# Patient Record
Sex: FEMALE | Race: BLACK OR AFRICAN AMERICAN | NOT HISPANIC OR LATINO
[De-identification: names, ages, dates, MRNs, and addresses within clinical notes are randomized per-mention and may not be internally consistent; named-entity substitution may affect disease eponyms.]

---

## 2017-06-12 PROBLEM — Z00.00 ENCOUNTER FOR PREVENTIVE HEALTH EXAMINATION: Status: ACTIVE | Noted: 2017-06-12

## 2017-06-20 ENCOUNTER — APPOINTMENT (OUTPATIENT)
Dept: PULMONOLOGY | Facility: CLINIC | Age: 82
End: 2017-06-20

## 2017-11-06 ENCOUNTER — INPATIENT (INPATIENT)
Facility: HOSPITAL | Age: 82
LOS: 6 days | Discharge: HOME | DRG: 287 | End: 2017-11-13
Attending: INTERNAL MEDICINE | Admitting: GENERAL PRACTICE
Payer: MEDICAID

## 2017-11-06 VITALS
DIASTOLIC BLOOD PRESSURE: 88 MMHG | OXYGEN SATURATION: 100 % | TEMPERATURE: 98 F | SYSTOLIC BLOOD PRESSURE: 170 MMHG | RESPIRATION RATE: 19 BRPM | HEART RATE: 53 BPM

## 2017-11-06 DIAGNOSIS — Z98.51 TUBAL LIGATION STATUS: Chronic | ICD-10-CM

## 2017-11-06 DIAGNOSIS — R07.9 CHEST PAIN, UNSPECIFIED: ICD-10-CM

## 2017-11-06 LAB
ALBUMIN SERPL ELPH-MCNC: 4.9 G/DL — SIGNIFICANT CHANGE UP (ref 3.3–5)
ALP SERPL-CCNC: 72 U/L — SIGNIFICANT CHANGE UP (ref 40–120)
ALT FLD-CCNC: 17 U/L RC — SIGNIFICANT CHANGE UP (ref 10–45)
ANION GAP SERPL CALC-SCNC: 14 MMOL/L — SIGNIFICANT CHANGE UP (ref 5–17)
APPEARANCE UR: CLEAR — SIGNIFICANT CHANGE UP
APTT BLD: 22.8 SEC — LOW (ref 27.5–37.4)
AST SERPL-CCNC: 25 U/L — SIGNIFICANT CHANGE UP (ref 10–40)
BACTERIA # UR AUTO: ABNORMAL /HPF
BASOPHILS # BLD AUTO: 0.1 K/UL — SIGNIFICANT CHANGE UP (ref 0–0.2)
BASOPHILS NFR BLD AUTO: 1.4 % — SIGNIFICANT CHANGE UP (ref 0–2)
BILIRUB SERPL-MCNC: 0.4 MG/DL — SIGNIFICANT CHANGE UP (ref 0.2–1.2)
BILIRUB UR-MCNC: NEGATIVE — SIGNIFICANT CHANGE UP
BUN SERPL-MCNC: 14 MG/DL — SIGNIFICANT CHANGE UP (ref 7–23)
CALCIUM SERPL-MCNC: 10.1 MG/DL — SIGNIFICANT CHANGE UP (ref 8.4–10.5)
CHLORIDE SERPL-SCNC: 103 MMOL/L — SIGNIFICANT CHANGE UP (ref 96–108)
CK MB BLD-MCNC: 2 % — SIGNIFICANT CHANGE UP (ref 0–3.5)
CK MB CFR SERPL CALC: 1.8 NG/ML — SIGNIFICANT CHANGE UP (ref 0–3.8)
CK MB CFR SERPL CALC: 2.1 NG/ML — SIGNIFICANT CHANGE UP (ref 0–3.8)
CK SERPL-CCNC: 91 U/L — SIGNIFICANT CHANGE UP (ref 25–170)
CK SERPL-CCNC: 98 U/L — SIGNIFICANT CHANGE UP (ref 25–170)
CO2 SERPL-SCNC: 24 MMOL/L — SIGNIFICANT CHANGE UP (ref 22–31)
COLOR SPEC: SIGNIFICANT CHANGE UP
CREAT SERPL-MCNC: 1.04 MG/DL — SIGNIFICANT CHANGE UP (ref 0.5–1.3)
DIFF PNL FLD: NEGATIVE — SIGNIFICANT CHANGE UP
EOSINOPHIL # BLD AUTO: 0.3 K/UL — SIGNIFICANT CHANGE UP (ref 0–0.5)
EOSINOPHIL NFR BLD AUTO: 4.6 % — SIGNIFICANT CHANGE UP (ref 0–6)
EPI CELLS # UR: SIGNIFICANT CHANGE UP /HPF
GLUCOSE SERPL-MCNC: 91 MG/DL — SIGNIFICANT CHANGE UP (ref 70–99)
GLUCOSE UR QL: NEGATIVE — SIGNIFICANT CHANGE UP
HCT VFR BLD CALC: 35.3 % — SIGNIFICANT CHANGE UP (ref 34.5–45)
HGB BLD-MCNC: 12.2 G/DL — SIGNIFICANT CHANGE UP (ref 11.5–15.5)
INR BLD: 0.98 RATIO — SIGNIFICANT CHANGE UP (ref 0.88–1.16)
KETONES UR-MCNC: NEGATIVE — SIGNIFICANT CHANGE UP
LEUKOCYTE ESTERASE UR-ACNC: NEGATIVE — SIGNIFICANT CHANGE UP
LIDOCAIN IGE QN: 42 U/L — SIGNIFICANT CHANGE UP (ref 7–60)
LYMPHOCYTES # BLD AUTO: 2.5 K/UL — SIGNIFICANT CHANGE UP (ref 1–3.3)
LYMPHOCYTES # BLD AUTO: 35.2 % — SIGNIFICANT CHANGE UP (ref 13–44)
MCHC RBC-ENTMCNC: 32.4 PG — SIGNIFICANT CHANGE UP (ref 27–34)
MCHC RBC-ENTMCNC: 34.4 GM/DL — SIGNIFICANT CHANGE UP (ref 32–36)
MCV RBC AUTO: 94 FL — SIGNIFICANT CHANGE UP (ref 80–100)
MONOCYTES # BLD AUTO: 0.5 K/UL — SIGNIFICANT CHANGE UP (ref 0–0.9)
MONOCYTES NFR BLD AUTO: 7.4 % — SIGNIFICANT CHANGE UP (ref 2–14)
NEUTROPHILS # BLD AUTO: 3.6 K/UL — SIGNIFICANT CHANGE UP (ref 1.8–7.4)
NEUTROPHILS NFR BLD AUTO: 51.4 % — SIGNIFICANT CHANGE UP (ref 43–77)
NITRITE UR-MCNC: NEGATIVE — SIGNIFICANT CHANGE UP
NT-PROBNP SERPL-SCNC: 78 PG/ML — SIGNIFICANT CHANGE UP (ref 0–300)
PH UR: 6 — SIGNIFICANT CHANGE UP (ref 5–8)
PLATELET # BLD AUTO: 253 K/UL — SIGNIFICANT CHANGE UP (ref 150–400)
POTASSIUM SERPL-MCNC: 4.3 MMOL/L — SIGNIFICANT CHANGE UP (ref 3.5–5.3)
POTASSIUM SERPL-SCNC: 4.3 MMOL/L — SIGNIFICANT CHANGE UP (ref 3.5–5.3)
PROT SERPL-MCNC: 8.5 G/DL — HIGH (ref 6–8.3)
PROT UR-MCNC: NEGATIVE — SIGNIFICANT CHANGE UP
PROTHROM AB SERPL-ACNC: 10.7 SEC — SIGNIFICANT CHANGE UP (ref 9.8–12.7)
RBC # BLD: 3.76 M/UL — LOW (ref 3.8–5.2)
RBC # FLD: 11.5 % — SIGNIFICANT CHANGE UP (ref 10.3–14.5)
SODIUM SERPL-SCNC: 141 MMOL/L — SIGNIFICANT CHANGE UP (ref 135–145)
SP GR SPEC: 1.01 — LOW (ref 1.01–1.02)
TROPONIN T SERPL-MCNC: <0.01 NG/ML — SIGNIFICANT CHANGE UP (ref 0–0.06)
TROPONIN T SERPL-MCNC: <0.01 NG/ML — SIGNIFICANT CHANGE UP (ref 0–0.06)
UROBILINOGEN FLD QL: NEGATIVE — SIGNIFICANT CHANGE UP
WBC # BLD: 7.1 K/UL — SIGNIFICANT CHANGE UP (ref 3.8–10.5)
WBC # FLD AUTO: 7.1 K/UL — SIGNIFICANT CHANGE UP (ref 3.8–10.5)

## 2017-11-06 PROCEDURE — 93010 ELECTROCARDIOGRAM REPORT: CPT

## 2017-11-06 PROCEDURE — 71010: CPT | Mod: 26

## 2017-11-06 PROCEDURE — 99285 EMERGENCY DEPT VISIT HI MDM: CPT | Mod: 25

## 2017-11-06 PROCEDURE — 76705 ECHO EXAM OF ABDOMEN: CPT | Mod: 26

## 2017-11-06 RX ORDER — SIMVASTATIN 20 MG/1
10 TABLET, FILM COATED ORAL AT BEDTIME
Qty: 0 | Refills: 0 | Status: DISCONTINUED | OUTPATIENT
Start: 2017-11-06 | End: 2017-11-13

## 2017-11-06 RX ORDER — BUDESONIDE AND FORMOTEROL FUMARATE DIHYDRATE 160; 4.5 UG/1; UG/1
2 AEROSOL RESPIRATORY (INHALATION)
Qty: 0 | Refills: 0 | Status: DISCONTINUED | OUTPATIENT
Start: 2017-11-06 | End: 2017-11-13

## 2017-11-06 RX ORDER — LEVOTHYROXINE SODIUM 125 MCG
25 TABLET ORAL DAILY
Qty: 0 | Refills: 0 | Status: DISCONTINUED | OUTPATIENT
Start: 2017-11-06 | End: 2017-11-13

## 2017-11-06 RX ORDER — HYDROCHLOROTHIAZIDE 25 MG
25 TABLET ORAL DAILY
Qty: 0 | Refills: 0 | Status: DISCONTINUED | OUTPATIENT
Start: 2017-11-06 | End: 2017-11-10

## 2017-11-06 RX ORDER — IPRATROPIUM/ALBUTEROL SULFATE 18-103MCG
3 AEROSOL WITH ADAPTER (GRAM) INHALATION ONCE
Qty: 0 | Refills: 0 | Status: COMPLETED | OUTPATIENT
Start: 2017-11-06 | End: 2017-11-06

## 2017-11-06 RX ORDER — HEPARIN SODIUM 5000 [USP'U]/ML
5000 INJECTION INTRAVENOUS; SUBCUTANEOUS EVERY 12 HOURS
Qty: 0 | Refills: 0 | Status: DISCONTINUED | OUTPATIENT
Start: 2017-11-06 | End: 2017-11-13

## 2017-11-06 RX ORDER — LOSARTAN POTASSIUM 100 MG/1
100 TABLET, FILM COATED ORAL DAILY
Qty: 0 | Refills: 0 | Status: DISCONTINUED | OUTPATIENT
Start: 2017-11-06 | End: 2017-11-10

## 2017-11-06 RX ORDER — ACETAMINOPHEN 500 MG
975 TABLET ORAL ONCE
Qty: 0 | Refills: 0 | Status: COMPLETED | OUTPATIENT
Start: 2017-11-06 | End: 2017-11-06

## 2017-11-06 RX ORDER — PANTOPRAZOLE SODIUM 20 MG/1
40 TABLET, DELAYED RELEASE ORAL
Qty: 0 | Refills: 0 | Status: DISCONTINUED | OUTPATIENT
Start: 2017-11-06 | End: 2017-11-13

## 2017-11-06 RX ORDER — ASPIRIN/CALCIUM CARB/MAGNESIUM 324 MG
324 TABLET ORAL ONCE
Qty: 0 | Refills: 0 | Status: COMPLETED | OUTPATIENT
Start: 2017-11-06 | End: 2017-11-06

## 2017-11-06 RX ORDER — IBUPROFEN 200 MG
400 TABLET ORAL THREE TIMES A DAY
Qty: 0 | Refills: 0 | Status: DISCONTINUED | OUTPATIENT
Start: 2017-11-06 | End: 2017-11-07

## 2017-11-06 RX ORDER — ALBUTEROL 90 UG/1
2 AEROSOL, METERED ORAL EVERY 6 HOURS
Qty: 0 | Refills: 0 | Status: DISCONTINUED | OUTPATIENT
Start: 2017-11-06 | End: 2017-11-13

## 2017-11-06 RX ADMIN — Medication 400 MILLIGRAM(S): at 21:17

## 2017-11-06 RX ADMIN — LOSARTAN POTASSIUM 100 MILLIGRAM(S): 100 TABLET, FILM COATED ORAL at 15:18

## 2017-11-06 RX ADMIN — Medication 0.1 MILLIGRAM(S): at 16:12

## 2017-11-06 RX ADMIN — Medication 3 MILLILITER(S): at 10:20

## 2017-11-06 RX ADMIN — Medication 400 MILLIGRAM(S): at 22:00

## 2017-11-06 RX ADMIN — Medication 324 MILLIGRAM(S): at 10:20

## 2017-11-06 RX ADMIN — Medication 125 MILLIGRAM(S): at 10:36

## 2017-11-06 RX ADMIN — HEPARIN SODIUM 5000 UNIT(S): 5000 INJECTION INTRAVENOUS; SUBCUTANEOUS at 18:57

## 2017-11-06 RX ADMIN — Medication 975 MILLIGRAM(S): at 11:33

## 2017-11-06 RX ADMIN — Medication 975 MILLIGRAM(S): at 10:20

## 2017-11-06 RX ADMIN — Medication 25 MILLIGRAM(S): at 16:12

## 2017-11-06 RX ADMIN — BUDESONIDE AND FORMOTEROL FUMARATE DIHYDRATE 2 PUFF(S): 160; 4.5 AEROSOL RESPIRATORY (INHALATION) at 18:57

## 2017-11-06 RX ADMIN — SIMVASTATIN 10 MILLIGRAM(S): 20 TABLET, FILM COATED ORAL at 21:17

## 2017-11-06 RX ADMIN — Medication 3 MILLILITER(S): at 11:33

## 2017-11-06 NOTE — ED PROVIDER NOTE - OBJECTIVE STATEMENT
82 yo female with HTN hypothyroidism asthma hx of stroke presenting with constant crampy squeezing chest pain which radiates to the back associated with chest pain.  symptoms have been going on since Thursday.  received flu vaccine one week ago.  still endorses chest pain at presentation.  states that it is in the epigastric region with radiation to the right shoulder.  pain worse with movement, eating and taking a deep breath.  last bm was yesterday, nonbloody.      pcp- [facility] 82 yo female with HTN hypothyroidism asthma hx of stroke presenting with constant crampy squeezing chest pain which radiates to the back associated with chest pain.  symptoms have been going on since Friday.  received flu vaccine one week ago.  still endorses chest pain at presentation.  states that it is in the epigastric region with radiation to the right shoulder.  pain worse with movement, eating and taking a deep breath.  last bm was yesterday, nonbloody.      pcp- [facility] [facility] 82 yo female with HTN hypothyroidism asthma hx of stroke presenting with constant crampy squeezing chest pain which radiates to the back associated with chest pain.  symptoms have been going on since Friday.  received flu vaccine one week ago.  still endorses chest pain at presentation.  states that it is in the epigastric region with radiation to the right shoulder.  pain worse with movement, eating and taking a deep breath.  last bm was yesterday, nonbloody.      pcp- [facility] [place] 84 yo female with HTN hypothyroidism asthma hx of stroke presenting with constant crampy squeezing chest pain which radiates to the back associated with chest pain.  symptoms have been going on since Friday.  received flu vaccine one week ago.  still endorses chest pain at presentation.  states that it is in the epigastric region with radiation to the right shoulder.  pain worse with movement, eating and taking a deep breath.  last bm was yesterday, nonbloody.  doesn't think she has ever had a stress test or echo.     pcp- [facility] 84 yo female with HTN hypothyroidism asthma hx of stroke presenting with constant crampy squeezing chest pain which radiates to the back associated with chest pain.  symptoms have been going on since Friday.  received flu vaccine one week ago.  still endorses chest pain at presentation.  states that it is in the epigastric region with radiation to the right shoulder.  pain worse with movement, eating and taking a deep breath.  last bm was yesterday, nonbloody.  last echo was in the summer does not remember her last stress test.     pcp- [facility]

## 2017-11-06 NOTE — H&P ADULT - HISTORY OF PRESENT ILLNESS
82 yo female with PMH HTN, hypothyroidism, asthma, prior CVA, to ER with constant crampy squeezing chest pain which radiates to the back associated with chest pain.  Symptoms have been going on since Friday.  Received flu vaccine one week ago.  Still endorses chest pain at presentation.  States that it is in the epigastric region with radiation to the right shoulder.  Pain worse with movement, eating and taking a deep breath.  Last echo was in the summer does not remember her last stress test.     Denies HA, syncope, abdominal pain, hematuria, melena, fevers or leg edema. 82 yo female with PMH HTN, hypothyroidism, asthma, prior CVA, to ER with constant crampy squeezing chest pain which radiates to the back associated with chest pain.  Symptoms have been going on since Friday but worsened this AM.  Received flu vaccine one week ago.  Still endorses chest pain with palpation of chest wall. No angina. States that it is in the epigastric region with radiation to the right shoulder.  Pain worse with movement, eating and taking a deep breath.  Last echo was in the summer and reported to family as normal. Does not remember her last stress test.   No prior cardiac stents or DM.     Denies HA, syncope, abdominal pain, hematuria, melena, fevers or leg edema. 84 yo female with PMH HTN, hypothyroidism, asthma, prior CVA, to ER with constant crampy squeezing chest pain which radiates to the back associated with chest pain.  Symptoms have been going on since Friday but worsened this AM.  Received flu vaccine one week ago.  Still endorses chest pain with palpation of chest wall. No angina. States that it is in the epigastric region with radiation to the right shoulder.  Pain worse with movement, eating and taking a deep breath.  Last echo was in the summer and reported to family as normal. Does not remember her last stress test.   No prior cardiac stents or DM.     Denies HA, syncope, abdominal pain, hematuria, melena, fevers or leg edema.    Daughter phone number to assist with translation is [phone]

## 2017-11-06 NOTE — ED PROVIDER NOTE - PROGRESS NOTE DETAILS
Windy MCGREGOR: Patient reassessed. Lung exam has improved. Patient moving air well. She is asking for another neb treatment. Will order. Windy MCGREGOR: Patient continues to complain of chest pain. TWI in lateral leads with no prior ekg for comparison. Will admit.

## 2017-11-06 NOTE — ED ADULT NURSE REASSESSMENT NOTE - NS ED NURSE REASSESS COMMENT FT1
SOB and wheezes improved after duoneb. Chest pain partially relieved with tylenol, still complaining of chest squeezing. Patient on cardiac monitor, daughter at bedside will continue to reassess.

## 2017-11-06 NOTE — ED PROVIDER NOTE - MUSCULOSKELETAL, MLM
Spine appears normal, range of motion is not limited, no muscle or joint tenderness Spine appears normal, range of motion is not limited, right posterior chest wall tenderness over the lower ribs, 1+ pitting edema in bilateral lower extremities

## 2017-11-06 NOTE — ED PROVIDER NOTE - NS ED ROS FT
Constitutional: no fever  Eyes: no conjunctivitis  Ears: no ear pain   Nose: no nasal congestion, Mouth/Throat: no throat pain, Neck: no stiffness  Cardiovascular: + chest pain +SOB  Chest: + cough  Gastrointestinal: + abdominal pain, no vomiting and diarrhea  MSK: no joint pain  : no dysuria  Skin: no rash  Neuro: no LOC

## 2017-11-06 NOTE — ED PROVIDER NOTE - MEDICAL DECISION MAKING DETAILS
84 yo female with chest pain; gallbladder etiology vs asthma vs acs; will obtain bloodwork ekg cxr neb gallbladder us --> re eval

## 2017-11-06 NOTE — ED PROVIDER NOTE - ATTENDING CONTRIBUTION TO CARE
I performed a history and physical exam of the patient and discussed their management with the resident. I reviewed the resident's note and agree with the documented findings and plan of care. My medical decision making and observations are found above.    Patient is an 84 yo F with history of HTN, hypothyroidism, CVA here for chest pain and sob since 5 days. Patient was recently diagnosed with 'asthma' 2 months ago. + smoking history > 49 yo. Patient was a chain smoker for about 20 years. No fevers. + cough. No nausea, vomiting. She complains of pain in her right abdomen, right chest with radiation to right shoulder. She has not tried any neb treatments at home.  VS noted  Gen. no acute distress, Non toxic   HEENT: EOMI, mmm  Lungs: + end expiratory wheezing  CVS: RRR   Abd; Soft ttp in RUQ, no rebound or guarding, + right CVA tenderness  Ext: min b/l edema  Skin: no rash  Neuro AAOx3 non focal clear speech  a/p: sob/cp - wheezing on exam - will treat with nebs and reassess. Abd exam sig for RUQ tenderness - will do ED US. Will get CE, cbc, cmp, CXR and reassess.

## 2017-11-06 NOTE — CONSULT NOTE ADULT - SUBJECTIVE AND OBJECTIVE BOX
Patient is a 83y old  Female who presents with a chief complaint of CP.    HPI: Pt awoke ~5 AM with epigastric chest discomfort.  She reports over past 1-2 weeks she has had a cough with cold-like symptoms and wheezing, treated by pmd.  She got the flu shot ~1 week ago.  She has had constant chest discomfort since waking, worse with inspiration, cough, sneeze, palpation or movement.  She also has discomfort along right sided ribs and back.  She states symptoms have improved after nebulizers in ER.           *****PAST MEDICAL / Surgical  HISTORY:  PAST MEDICAL & SURGICAL HISTORY:  Hypothyroidism  CVA (cerebral vascular accident)  Hypertension  H/O tubal ligation           *****FAMILY HISTORY:  FAMILY HISTORY:           *****SOCIAL HISTORY:  Alcohol: None  Smoking: None         *****ALLERGIES:   Allergies    penicillin (Unknown)    Intolerances             *****MEDICATIONS:  MEDICATIONS  (STANDING):    MEDICATIONS  (PRN):           *****REVIEW OF SYSTEM:  GEN: no fever, no chills, no pain  RESP: no SOB, no cough, no sputum  CVS: no chest pain, no palpitations, no edema  GI: no abdominal pain, no nausea, no vomiting, no constipation, no diarrhea  : no dysurea, no frequency, no hematurea  Neuro: no headache, no dizziness  PSYCH: no anxiety, no depression  Derm : no itching, no rash         *****VITAL SIGNS:  T(C): 36.4 (17 @ 10:00), Max: 36.4 (17 @ 10:00)  HR: 68 (17 @ 12:29) (53 - 68)  BP: 178/80 (17 @ 12:29) (170/88 - 178/80)  RR: 18 (17 @ 12:29) (18 - 19)  SpO2: 98% (17 @ 12:29) (98% - 100%)  Wt(kg): --           *****PHYSICAL EXAM:  GEN: A&O X 3 , NAD , comfortable  HEENT: NCAT, PERRL, MMM, hearing intact  Neck: supple , no JVD  CVS: S1S2 , regular , No M/R/G appreciated  PULM: CTA B/L,  no W/R/R appreciated (post nebulizers x2)  ABD.: soft. non tender, non distended,  bowel sounds present  Extrem: intact pulses , no edema   Derm: No rash , no ecchymoses  PSYCH : normal mood,  no delusion not anxious         *****LAB AND IMAGIN.2   7.1   )-----------( 253      ( 2017 10:06 )             35.3               11-    141  |  103  |  14  ----------------------------<  91  4.3   |  24  |  1.04    Ca    10.1      2017 10:06    TPro  8.5<H>  /  Alb  4.9  /  TBili  0.4  /  DBili  x   /  AST  25  /  ALT  17  /  AlkPhos  72  11-06    PT/INR - ( 2017 10:46 )   PT: 10.7 sec;   INR: 0.98 ratio         PTT - ( 2017 10:46 )  PTT:22.8 sec            CARDIAC MARKERS ( 2017 10:06 )  x     / <0.01 ng/mL / 98 U/L / x     / 2.1 ng/mL              Urinalysis Basic - ( 2017 11:05 )    Color: PL Yellow / Appearance: Clear / S.007 / pH: x  Gluc: x / Ketone: Negative  / Bili: Negative / Urobili: Negative   Blood: x / Protein: Negative / Nitrite: Negative   Leuk Esterase: Negative / RBC: x / WBC x   Sq Epi: x / Non Sq Epi: OCC /HPF / Bacteria: Few /HPF        [All pertinent recent Imaging reports reviewed]         *****A S S E S S M E N T   A N D   P L A N :  83F with pleuritic CP after 1-2 weeks of ?viral URI.  No S+S of CHF (low bnp, clear cxr)  first set CE negative, repeat to r/o MI  tele for now  check RVP  echo  low suspicion for acs but given risk factors would get nuc stress test  per daughter pt is active so less likely PE  check LE doppler  likely musculoskeletal pain  NSAIDS prn  nebs prn  consider pulm eval      ___________________________  Will follow with you.  Thank you,  BRENDAN BonillaO.

## 2017-11-06 NOTE — H&P ADULT - ASSESSMENT
84 yo female with PMH HTN, hypothyroidism, asthma, prior CVA, to ER with constant crampy squeezing chest pain which radiates to the back associated with chest pain.  Symptoms have been going on since Friday.  Received flu vaccine one week ago.  Still endorses chest pain at presentation.  States that it is in the epigastric region with radiation to the right shoulder.  Pain worse with movement, eating and taking a deep breath.  Last echo was in the summer does not remember her last stress test.    CV: Atypical CP. CPK every 8 hours. Admit to telemetry. 84 yo female with PMH HTN, hypothyroidism, asthma, prior CVA, to ER with constant crampy squeezing chest pain which radiates to the back associated with chest pain.  Symptoms have been going on since Friday.  Received flu vaccine one week ago.  Still endorses chest pain at presentation.  States that it is in the epigastric region with radiation to the right shoulder.  Pain worse with movement, eating and taking a deep breath.  Last echo was in the summer does not remember her last stress test.      EKG in ER shows NSR, first degree AV block and LVH. Initial Labs and CPK all normal. BNP of 78 essential excludes CHF as consideration.     CV: Atypical CP. CPK every 8 hours. Admit to telemetry. Continue Cozaar 100 mg/day and HCTZ 25 mg/day, Zocur 10 mg/day.  Lipid panel in AM. Chemical stress test in AM. Add Motrin tid for reproducible CP on palpation.     Pulmonary: Continue Symbicort bid and Proventil PRN.     Endo: Continue Synthroid 25 mcg/day. TSH in AM.

## 2017-11-06 NOTE — ED ADULT NURSE NOTE - OBJECTIVE STATEMENT
83y female presents to ED complaining of chest pain. Pt is a 83y female presents to ED complaining of chest pain. Pt is a a/ox3, speaking Russian with daughter at bedside providing interpretation, denying translation services. Daughter states that her mother has felt off since Friday and this morning her mother was very SOB with cough, diaphroetic complaining of substernal chest tightness that radiates to back. Wheezes auscultated bilaterally, skin warm dry intact, radial and pedal pulses strong and regular. Pt abdomen is soft and tender to palpation in RUQ, bowel sounds present in all  quadrants. Pt denies fever and chills, denies numbness and tingling, denies dysuria, denies N/V/D. Last BM was yester, no blood in stool. Pt is resting in bed with family at bedside, EKG completed.

## 2017-11-06 NOTE — H&P ADULT - NSHPPHYSICALEXAM_GEN_ALL_CORE
PHYSICAL EXAMINATION:  Vital Signs Last 24 Hrs  T(C): 36.6 (06 Nov 2017 13:27), Max: 36.6 (06 Nov 2017 13:27)  T(F): 97.9 (06 Nov 2017 13:27), Max: 97.9 (06 Nov 2017 13:27)  HR: 64 (06 Nov 2017 13:27) (53 - 68)  BP: 175/73 (06 Nov 2017 13:27) (170/88 - 178/80)  BP(mean): --  RR: 18 (06 Nov 2017 13:27) (18 - 19)  SpO2: 96% (06 Nov 2017 13:27) (96% - 100%)  CAPILLARY BLOOD GLUCOSE          GENERAL: NAD, well-groomed, well-developed  HEAD:  atraumatic, normocephalic  EYES: sclera anicteric  ENMT: mucous membranes moist  NECK: supple, No JVD  CHEST/LUNG: clear to auscultation bilaterally; no rales, rhonchi, or wheezing b/l  HEART: normal S1, S2  ABDOMEN: BS+, soft, ND, NT   EXTREMITIES:  pulses palpable; no clubbing, cyanosis, or edema b/l LEs  NEURO: awake, alert, interactive; moves all extremities  SKIN: no rashes or lesions PHYSICAL EXAMINATION:  Vital Signs Last 24 Hrs  T(C): 36.6 (06 Nov 2017 13:27), Max: 36.6 (06 Nov 2017 13:27)  T(F): 97.9 (06 Nov 2017 13:27), Max: 97.9 (06 Nov 2017 13:27)  HR: 64 (06 Nov 2017 13:27) (53 - 68)  BP: 175/73 (06 Nov 2017 13:27) (170/88 - 178/80)  BP(mean): --  RR: 18 (06 Nov 2017 13:27) (18 - 19)  SpO2: 96% (06 Nov 2017 13:27) (96% - 100%)  CAPILLARY BLOOD GLUCOSE          GENERAL: NAD, well-groomed, well-developed, daughter at bedside. NO CP at present.   HEAD:  atraumatic, normocephalic  EYES: sclera anicteric  ENMT: mucous membranes moist  NECK: supple, No JVD  CHEST/LUNG: clear to auscultation bilaterally; no rales, rhonchi, or wheezing b/l  HEART: normal S1, S2  ABDOMEN: BS+, soft, ND, NT   EXTREMITIES:  pulses palpable; no clubbing, cyanosis, or edema b/l LEs  NEURO: awake, alert, interactive; moves all extremities  SKIN: no rashes or lesions

## 2017-11-06 NOTE — H&P ADULT - NSHPLABSRESULTS_GEN_ALL_CORE
LABS:                        12.2   7.1   )-----------( 253      ( 2017 10:06 )             35.3     11-    141  |  103  |  14  ----------------------------<  91  4.3   |  24  |  1.04    Ca    10.1      2017 10:06    TPro  8.5<H>  /  Alb  4.9  /  TBili  0.4  /  DBili  x   /  AST  25  /  ALT  17  /  AlkPhos  72  11-06    PT/INR - ( 2017 10:46 )   PT: 10.7 sec;   INR: 0.98 ratio         PTT - ( 2017 10:46 )  PTT:22.8 sec  Urinalysis Basic - ( 2017 11:05 )    Color: PL Yellow / Appearance: Clear / S.007 / pH: x  Gluc: x / Ketone: Negative  / Bili: Negative / Urobili: Negative   Blood: x / Protein: Negative / Nitrite: Negative   Leuk Esterase: Negative / RBC: x / WBC x   Sq Epi: x / Non Sq Epi: OCC /HPF / Bacteria: Few /HPF          RADIOLOGY & ADDITIONAL TESTS:

## 2017-11-07 LAB
CHOLEST SERPL-MCNC: 175 MG/DL — SIGNIFICANT CHANGE UP (ref 10–199)
CK MB BLD-MCNC: 2.9 % — SIGNIFICANT CHANGE UP (ref 0–3.5)
CK MB CFR SERPL CALC: 2.4 NG/ML — SIGNIFICANT CHANGE UP (ref 0–3.8)
CK SERPL-CCNC: 83 U/L — SIGNIFICANT CHANGE UP (ref 25–170)
CK SERPL-CCNC: 88 U/L — SIGNIFICANT CHANGE UP (ref 25–170)
CULTURE RESULTS: NO GROWTH — SIGNIFICANT CHANGE UP
HDLC SERPL-MCNC: 61 MG/DL — SIGNIFICANT CHANGE UP (ref 40–125)
LIPID PNL WITH DIRECT LDL SERPL: 100 MG/DL — SIGNIFICANT CHANGE UP
MYOGLOBIN SERPL-MCNC: 33 NG/ML — SIGNIFICANT CHANGE UP (ref 9–82)
SPECIMEN SOURCE: SIGNIFICANT CHANGE UP
TOTAL CHOLESTEROL/HDL RATIO MEASUREMENT: 2.9 RATIO — LOW (ref 3.3–7.1)
TRIGL SERPL-MCNC: 69 MG/DL — SIGNIFICANT CHANGE UP (ref 10–149)
TROPONIN T SERPL-MCNC: <0.01 NG/ML — SIGNIFICANT CHANGE UP (ref 0–0.06)
TSH SERPL-MCNC: 0.68 UIU/ML — SIGNIFICANT CHANGE UP (ref 0.27–4.2)

## 2017-11-07 PROCEDURE — 78452 HT MUSCLE IMAGE SPECT MULT: CPT | Mod: 26

## 2017-11-07 PROCEDURE — 93970 EXTREMITY STUDY: CPT | Mod: 26

## 2017-11-07 PROCEDURE — 93016 CV STRESS TEST SUPVJ ONLY: CPT

## 2017-11-07 PROCEDURE — 93018 CV STRESS TEST I&R ONLY: CPT

## 2017-11-07 PROCEDURE — 93010 ELECTROCARDIOGRAM REPORT: CPT

## 2017-11-07 RX ADMIN — PANTOPRAZOLE SODIUM 40 MILLIGRAM(S): 20 TABLET, DELAYED RELEASE ORAL at 05:08

## 2017-11-07 RX ADMIN — Medication 25 MICROGRAM(S): at 05:08

## 2017-11-07 RX ADMIN — BUDESONIDE AND FORMOTEROL FUMARATE DIHYDRATE 2 PUFF(S): 160; 4.5 AEROSOL RESPIRATORY (INHALATION) at 17:09

## 2017-11-07 RX ADMIN — Medication 25 MILLIGRAM(S): at 05:08

## 2017-11-07 RX ADMIN — LOSARTAN POTASSIUM 100 MILLIGRAM(S): 100 TABLET, FILM COATED ORAL at 05:08

## 2017-11-07 RX ADMIN — SIMVASTATIN 10 MILLIGRAM(S): 20 TABLET, FILM COATED ORAL at 21:46

## 2017-11-07 RX ADMIN — BUDESONIDE AND FORMOTEROL FUMARATE DIHYDRATE 2 PUFF(S): 160; 4.5 AEROSOL RESPIRATORY (INHALATION) at 05:08

## 2017-11-07 RX ADMIN — Medication 400 MILLIGRAM(S): at 05:08

## 2017-11-07 RX ADMIN — Medication 400 MILLIGRAM(S): at 06:04

## 2017-11-07 RX ADMIN — HEPARIN SODIUM 5000 UNIT(S): 5000 INJECTION INTRAVENOUS; SUBCUTANEOUS at 17:09

## 2017-11-07 RX ADMIN — HEPARIN SODIUM 5000 UNIT(S): 5000 INJECTION INTRAVENOUS; SUBCUTANEOUS at 05:09

## 2017-11-07 NOTE — CHART NOTE - NSCHARTNOTEFT_GEN_A_CORE
Called by radiologist with abnormal stress test results, result was called to cardiologist Dr. Patel who states that will make a plan of care in am.  Pt seen and examined no acute distress noted. Will continue cardiac monitor.     Stefano Briceno   #[phone]

## 2017-11-07 NOTE — PROGRESS NOTE ADULT - ASSESSMENT
pt  with  chest  pressure,  resolved  tele, sinus  bradycardia   waiting echo/ stress test  on statin, coxaar,  hctz  seen by card  dc  motrin  spoke  with daughter

## 2017-11-07 NOTE — PROVIDER CONTACT NOTE (OTHER) - ACTION/TREATMENT ORDERED:
EKG ordered. NP will assess patient at bedside and continue to monitor on tele at this time. EKG ordered. NP will assess patient at bedside and continue to monitor on tele at this time.  Update: Patient sinus bradycardia with first degree AV block that was present on admission.

## 2017-11-08 LAB
ANION GAP SERPL CALC-SCNC: 14 MMOL/L — SIGNIFICANT CHANGE UP (ref 5–17)
BUN SERPL-MCNC: 31 MG/DL — HIGH (ref 7–23)
CALCIUM SERPL-MCNC: 9.8 MG/DL — SIGNIFICANT CHANGE UP (ref 8.4–10.5)
CHLORIDE SERPL-SCNC: 104 MMOL/L — SIGNIFICANT CHANGE UP (ref 96–108)
CO2 SERPL-SCNC: 24 MMOL/L — SIGNIFICANT CHANGE UP (ref 22–31)
CREAT SERPL-MCNC: 1.24 MG/DL — SIGNIFICANT CHANGE UP (ref 0.5–1.3)
GLUCOSE SERPL-MCNC: 100 MG/DL — HIGH (ref 70–99)
HCT VFR BLD CALC: 32.6 % — LOW (ref 34.5–45)
HGB BLD-MCNC: 11 G/DL — LOW (ref 11.5–15.5)
MCHC RBC-ENTMCNC: 30.6 PG — SIGNIFICANT CHANGE UP (ref 27–34)
MCHC RBC-ENTMCNC: 33.7 GM/DL — SIGNIFICANT CHANGE UP (ref 32–36)
MCV RBC AUTO: 90.8 FL — SIGNIFICANT CHANGE UP (ref 80–100)
PLATELET # BLD AUTO: 272 K/UL — SIGNIFICANT CHANGE UP (ref 150–400)
POTASSIUM SERPL-MCNC: 4.3 MMOL/L — SIGNIFICANT CHANGE UP (ref 3.5–5.3)
POTASSIUM SERPL-SCNC: 4.3 MMOL/L — SIGNIFICANT CHANGE UP (ref 3.5–5.3)
RBC # BLD: 3.59 M/UL — LOW (ref 3.8–5.2)
RBC # FLD: 13.4 % — SIGNIFICANT CHANGE UP (ref 10.3–14.5)
SODIUM SERPL-SCNC: 142 MMOL/L — SIGNIFICANT CHANGE UP (ref 135–145)
WBC # BLD: 10.95 K/UL — HIGH (ref 3.8–10.5)
WBC # FLD AUTO: 10.95 K/UL — HIGH (ref 3.8–10.5)

## 2017-11-08 RX ORDER — ASPIRIN/CALCIUM CARB/MAGNESIUM 324 MG
81 TABLET ORAL DAILY
Qty: 0 | Refills: 0 | Status: DISCONTINUED | OUTPATIENT
Start: 2017-11-08 | End: 2017-11-13

## 2017-11-08 RX ORDER — ACETAMINOPHEN 500 MG
650 TABLET ORAL EVERY 6 HOURS
Qty: 0 | Refills: 0 | Status: DISCONTINUED | OUTPATIENT
Start: 2017-11-08 | End: 2017-11-13

## 2017-11-08 RX ORDER — CLOPIDOGREL BISULFATE 75 MG/1
75 TABLET, FILM COATED ORAL DAILY
Qty: 0 | Refills: 0 | Status: DISCONTINUED | OUTPATIENT
Start: 2017-11-08 | End: 2017-11-13

## 2017-11-08 RX ADMIN — HEPARIN SODIUM 5000 UNIT(S): 5000 INJECTION INTRAVENOUS; SUBCUTANEOUS at 19:22

## 2017-11-08 RX ADMIN — PANTOPRAZOLE SODIUM 40 MILLIGRAM(S): 20 TABLET, DELAYED RELEASE ORAL at 05:23

## 2017-11-08 RX ADMIN — Medication 650 MILLIGRAM(S): at 16:12

## 2017-11-08 RX ADMIN — Medication 81 MILLIGRAM(S): at 13:16

## 2017-11-08 RX ADMIN — CLOPIDOGREL BISULFATE 75 MILLIGRAM(S): 75 TABLET, FILM COATED ORAL at 13:16

## 2017-11-08 RX ADMIN — HEPARIN SODIUM 5000 UNIT(S): 5000 INJECTION INTRAVENOUS; SUBCUTANEOUS at 05:23

## 2017-11-08 RX ADMIN — SIMVASTATIN 10 MILLIGRAM(S): 20 TABLET, FILM COATED ORAL at 22:00

## 2017-11-08 RX ADMIN — BUDESONIDE AND FORMOTEROL FUMARATE DIHYDRATE 2 PUFF(S): 160; 4.5 AEROSOL RESPIRATORY (INHALATION) at 19:22

## 2017-11-08 RX ADMIN — BUDESONIDE AND FORMOTEROL FUMARATE DIHYDRATE 2 PUFF(S): 160; 4.5 AEROSOL RESPIRATORY (INHALATION) at 05:23

## 2017-11-08 RX ADMIN — Medication 25 MICROGRAM(S): at 05:23

## 2017-11-08 RX ADMIN — Medication 25 MILLIGRAM(S): at 05:23

## 2017-11-08 RX ADMIN — LOSARTAN POTASSIUM 100 MILLIGRAM(S): 100 TABLET, FILM COATED ORAL at 05:23

## 2017-11-08 RX ADMIN — Medication 650 MILLIGRAM(S): at 17:00

## 2017-11-08 NOTE — PROGRESS NOTE ADULT - ASSESSMENT
pt  with  chest  pressure,  resolved  tele, sinus  bradycardia   waiting echo/   stress test, normal  ef,  abnormal, Tid  may  need  cardiac cath, defer  to  card  on statin, coxaar,  hctz.  will  add lopressor  seen by card  dc  motrin  spoke  with daughter pt  with  chest  pressure,  resolved  tele, sinus  bradycardia   waiting echo/   stress test, normal  ef,  abnormal, Tid  may  need  cardiac cath, defer  to  card  on statin, coxaar,  hctz.   unable  to add  lopressor, hr  was  45  seen by card  dc  motrin  spoke  with daughter

## 2017-11-09 LAB
ANION GAP SERPL CALC-SCNC: 18 MMOL/L — HIGH (ref 5–17)
BUN SERPL-MCNC: 26 MG/DL — HIGH (ref 7–23)
CALCIUM SERPL-MCNC: 10.1 MG/DL — SIGNIFICANT CHANGE UP (ref 8.4–10.5)
CHLORIDE SERPL-SCNC: 100 MMOL/L — SIGNIFICANT CHANGE UP (ref 96–108)
CO2 SERPL-SCNC: 21 MMOL/L — LOW (ref 22–31)
CREAT SERPL-MCNC: 1.19 MG/DL — SIGNIFICANT CHANGE UP (ref 0.5–1.3)
GLUCOSE BLDC GLUCOMTR-MCNC: 89 MG/DL — SIGNIFICANT CHANGE UP (ref 70–99)
GLUCOSE SERPL-MCNC: 125 MG/DL — HIGH (ref 70–99)
HCT VFR BLD CALC: 33.8 % — LOW (ref 34.5–45)
HGB BLD-MCNC: 11.1 G/DL — LOW (ref 11.5–15.5)
MCHC RBC-ENTMCNC: 30.2 PG — SIGNIFICANT CHANGE UP (ref 27–34)
MCHC RBC-ENTMCNC: 32.8 GM/DL — SIGNIFICANT CHANGE UP (ref 32–36)
MCV RBC AUTO: 91.8 FL — SIGNIFICANT CHANGE UP (ref 80–100)
PLATELET # BLD AUTO: 281 K/UL — SIGNIFICANT CHANGE UP (ref 150–400)
POTASSIUM SERPL-MCNC: 4.1 MMOL/L — SIGNIFICANT CHANGE UP (ref 3.5–5.3)
POTASSIUM SERPL-SCNC: 4.1 MMOL/L — SIGNIFICANT CHANGE UP (ref 3.5–5.3)
RBC # BLD: 3.68 M/UL — LOW (ref 3.8–5.2)
RBC # FLD: 13.8 % — SIGNIFICANT CHANGE UP (ref 10.3–14.5)
SODIUM SERPL-SCNC: 139 MMOL/L — SIGNIFICANT CHANGE UP (ref 135–145)
WBC # BLD: 8.28 K/UL — SIGNIFICANT CHANGE UP (ref 3.8–10.5)
WBC # FLD AUTO: 8.28 K/UL — SIGNIFICANT CHANGE UP (ref 3.8–10.5)

## 2017-11-09 PROCEDURE — 93306 TTE W/DOPPLER COMPLETE: CPT | Mod: 26

## 2017-11-09 RX ORDER — ATROPINE SULFATE 0.1 MG/ML
1 SYRINGE (ML) INJECTION ONCE
Qty: 0 | Refills: 0 | Status: COMPLETED | OUTPATIENT
Start: 2017-11-09 | End: 2017-11-09

## 2017-11-09 RX ORDER — IBUPROFEN 200 MG
400 TABLET ORAL ONCE
Qty: 0 | Refills: 0 | Status: DISCONTINUED | OUTPATIENT
Start: 2017-11-09 | End: 2017-11-10

## 2017-11-09 RX ORDER — RANOLAZINE 500 MG/1
500 TABLET, FILM COATED, EXTENDED RELEASE ORAL ONCE
Qty: 0 | Refills: 0 | Status: COMPLETED | OUTPATIENT
Start: 2017-11-09 | End: 2017-11-09

## 2017-11-09 RX ORDER — AMLODIPINE BESYLATE 2.5 MG/1
2.5 TABLET ORAL ONCE
Qty: 0 | Refills: 0 | Status: COMPLETED | OUTPATIENT
Start: 2017-11-09 | End: 2017-11-09

## 2017-11-09 RX ORDER — ONDANSETRON 8 MG/1
4 TABLET, FILM COATED ORAL ONCE
Qty: 0 | Refills: 0 | Status: COMPLETED | OUTPATIENT
Start: 2017-11-09 | End: 2017-11-09

## 2017-11-09 RX ADMIN — RANOLAZINE 500 MILLIGRAM(S): 500 TABLET, FILM COATED, EXTENDED RELEASE ORAL at 15:40

## 2017-11-09 RX ADMIN — Medication 25 MICROGRAM(S): at 06:28

## 2017-11-09 RX ADMIN — HEPARIN SODIUM 5000 UNIT(S): 5000 INJECTION INTRAVENOUS; SUBCUTANEOUS at 06:28

## 2017-11-09 RX ADMIN — Medication 81 MILLIGRAM(S): at 14:33

## 2017-11-09 RX ADMIN — BUDESONIDE AND FORMOTEROL FUMARATE DIHYDRATE 2 PUFF(S): 160; 4.5 AEROSOL RESPIRATORY (INHALATION) at 06:28

## 2017-11-09 RX ADMIN — Medication 650 MILLIGRAM(S): at 19:44

## 2017-11-09 RX ADMIN — Medication 650 MILLIGRAM(S): at 15:30

## 2017-11-09 RX ADMIN — Medication 650 MILLIGRAM(S): at 20:30

## 2017-11-09 RX ADMIN — CLOPIDOGREL BISULFATE 75 MILLIGRAM(S): 75 TABLET, FILM COATED ORAL at 14:32

## 2017-11-09 RX ADMIN — PANTOPRAZOLE SODIUM 40 MILLIGRAM(S): 20 TABLET, DELAYED RELEASE ORAL at 06:28

## 2017-11-09 RX ADMIN — Medication 1 MILLIGRAM(S): at 19:28

## 2017-11-09 RX ADMIN — SIMVASTATIN 10 MILLIGRAM(S): 20 TABLET, FILM COATED ORAL at 22:04

## 2017-11-09 RX ADMIN — BUDESONIDE AND FORMOTEROL FUMARATE DIHYDRATE 2 PUFF(S): 160; 4.5 AEROSOL RESPIRATORY (INHALATION) at 22:04

## 2017-11-09 RX ADMIN — AMLODIPINE BESYLATE 2.5 MILLIGRAM(S): 2.5 TABLET ORAL at 15:40

## 2017-11-09 RX ADMIN — LOSARTAN POTASSIUM 100 MILLIGRAM(S): 100 TABLET, FILM COATED ORAL at 06:28

## 2017-11-09 RX ADMIN — ONDANSETRON 4 MILLIGRAM(S): 8 TABLET, FILM COATED ORAL at 19:20

## 2017-11-09 RX ADMIN — Medication 25 MILLIGRAM(S): at 06:28

## 2017-11-09 RX ADMIN — Medication 650 MILLIGRAM(S): at 14:34

## 2017-11-09 NOTE — PROGRESS NOTE ADULT - ASSESSMENT
pt  with  chest  pressure,  resolved  tele, sinus  bradycardia   waiting echo/   stress test, normal  ef,  abnormal, Tid    needs  cardiac cath, , for today  on statin, coxaar,  hctz.   unable  to add  lopressor, hr  was  45  seen by card  dc  motrin  spoke  with daughter

## 2017-11-09 NOTE — CHART NOTE - NSCHARTNOTEFT_GEN_A_CORE
Pt 20 min s/p sheath pull for diagnostic cath was being repositioned and c/o severe right knee pain.    HR to 38 with blood pressure 98/50.  Pt symptomatic of vagal with nausea.    Pt responded to 150cc of IVF Atropine 1 amp and Zofran 4mg IVP.    Pt HR 62 and /66.  Pt groin stable.  Pt stable to transfer to floor.

## 2017-11-10 LAB
ALBUMIN SERPL ELPH-MCNC: 4.5 G/DL — SIGNIFICANT CHANGE UP (ref 3.3–5)
ALP SERPL-CCNC: 61 U/L — SIGNIFICANT CHANGE UP (ref 40–120)
ALT FLD-CCNC: 15 U/L RC — SIGNIFICANT CHANGE UP (ref 10–45)
ANION GAP SERPL CALC-SCNC: 14 MMOL/L — SIGNIFICANT CHANGE UP (ref 5–17)
ANION GAP SERPL CALC-SCNC: 16 MMOL/L — SIGNIFICANT CHANGE UP (ref 5–17)
AST SERPL-CCNC: 20 U/L — SIGNIFICANT CHANGE UP (ref 10–40)
BASOPHILS # BLD AUTO: 0.1 K/UL — SIGNIFICANT CHANGE UP (ref 0–0.2)
BASOPHILS NFR BLD AUTO: 0.8 % — SIGNIFICANT CHANGE UP (ref 0–2)
BILIRUB SERPL-MCNC: 0.2 MG/DL — SIGNIFICANT CHANGE UP (ref 0.2–1.2)
BUN SERPL-MCNC: 25 MG/DL — HIGH (ref 7–23)
BUN SERPL-MCNC: 26 MG/DL — HIGH (ref 7–23)
CALCIUM SERPL-MCNC: 9.1 MG/DL — SIGNIFICANT CHANGE UP (ref 8.4–10.5)
CALCIUM SERPL-MCNC: 9.5 MG/DL — SIGNIFICANT CHANGE UP (ref 8.4–10.5)
CHLORIDE SERPL-SCNC: 100 MMOL/L — SIGNIFICANT CHANGE UP (ref 96–108)
CHLORIDE SERPL-SCNC: 102 MMOL/L — SIGNIFICANT CHANGE UP (ref 96–108)
CK MB BLD-MCNC: 3.5 % — SIGNIFICANT CHANGE UP (ref 0–3.5)
CK MB CFR SERPL CALC: 1.9 NG/ML — SIGNIFICANT CHANGE UP (ref 0–3.8)
CK SERPL-CCNC: 54 U/L — SIGNIFICANT CHANGE UP (ref 25–170)
CO2 SERPL-SCNC: 22 MMOL/L — SIGNIFICANT CHANGE UP (ref 22–31)
CO2 SERPL-SCNC: 25 MMOL/L — SIGNIFICANT CHANGE UP (ref 22–31)
CREAT SERPL-MCNC: 1.34 MG/DL — HIGH (ref 0.5–1.3)
CREAT SERPL-MCNC: 1.35 MG/DL — HIGH (ref 0.5–1.3)
EOSINOPHIL # BLD AUTO: 0.2 K/UL — SIGNIFICANT CHANGE UP (ref 0–0.5)
EOSINOPHIL NFR BLD AUTO: 2.6 % — SIGNIFICANT CHANGE UP (ref 0–6)
GLUCOSE BLDC GLUCOMTR-MCNC: 150 MG/DL — HIGH (ref 70–99)
GLUCOSE SERPL-MCNC: 114 MG/DL — HIGH (ref 70–99)
GLUCOSE SERPL-MCNC: 146 MG/DL — HIGH (ref 70–99)
HCT VFR BLD CALC: 30.6 % — LOW (ref 34.5–45)
HCT VFR BLD CALC: 32.7 % — LOW (ref 34.5–45)
HGB BLD-MCNC: 10.3 G/DL — LOW (ref 11.5–15.5)
HGB BLD-MCNC: 11.6 G/DL — SIGNIFICANT CHANGE UP (ref 11.5–15.5)
INR BLD: 0.97 RATIO — SIGNIFICANT CHANGE UP (ref 0.88–1.16)
LACTATE BLDV-MCNC: 2 MMOL/L — SIGNIFICANT CHANGE UP (ref 0.7–2)
LYMPHOCYTES # BLD AUTO: 3.4 K/UL — HIGH (ref 1–3.3)
LYMPHOCYTES # BLD AUTO: 37.1 % — SIGNIFICANT CHANGE UP (ref 13–44)
MAGNESIUM SERPL-MCNC: 2.1 MG/DL — SIGNIFICANT CHANGE UP (ref 1.6–2.6)
MCHC RBC-ENTMCNC: 30.7 PG — SIGNIFICANT CHANGE UP (ref 27–34)
MCHC RBC-ENTMCNC: 33.4 PG — SIGNIFICANT CHANGE UP (ref 27–34)
MCHC RBC-ENTMCNC: 33.7 GM/DL — SIGNIFICANT CHANGE UP (ref 32–36)
MCHC RBC-ENTMCNC: 35.5 GM/DL — SIGNIFICANT CHANGE UP (ref 32–36)
MCV RBC AUTO: 91.3 FL — SIGNIFICANT CHANGE UP (ref 80–100)
MCV RBC AUTO: 94.1 FL — SIGNIFICANT CHANGE UP (ref 80–100)
MONOCYTES # BLD AUTO: 0.9 K/UL — SIGNIFICANT CHANGE UP (ref 0–0.9)
MONOCYTES NFR BLD AUTO: 9.4 % — SIGNIFICANT CHANGE UP (ref 2–14)
NEUTROPHILS # BLD AUTO: 4.6 K/UL — SIGNIFICANT CHANGE UP (ref 1.8–7.4)
NEUTROPHILS NFR BLD AUTO: 50.1 % — SIGNIFICANT CHANGE UP (ref 43–77)
PHOSPHATE SERPL-MCNC: 5.3 MG/DL — HIGH (ref 2.5–4.5)
PLATELET # BLD AUTO: 245 K/UL — SIGNIFICANT CHANGE UP (ref 150–400)
PLATELET # BLD AUTO: 260 K/UL — SIGNIFICANT CHANGE UP (ref 150–400)
POTASSIUM SERPL-MCNC: 4.2 MMOL/L — SIGNIFICANT CHANGE UP (ref 3.5–5.3)
POTASSIUM SERPL-MCNC: 4.3 MMOL/L — SIGNIFICANT CHANGE UP (ref 3.5–5.3)
POTASSIUM SERPL-SCNC: 4.2 MMOL/L — SIGNIFICANT CHANGE UP (ref 3.5–5.3)
POTASSIUM SERPL-SCNC: 4.3 MMOL/L — SIGNIFICANT CHANGE UP (ref 3.5–5.3)
PROT SERPL-MCNC: 7.5 G/DL — SIGNIFICANT CHANGE UP (ref 6–8.3)
PROTHROM AB SERPL-ACNC: 10.5 SEC — SIGNIFICANT CHANGE UP (ref 9.8–12.7)
RBC # BLD: 3.35 M/UL — LOW (ref 3.8–5.2)
RBC # BLD: 3.47 M/UL — LOW (ref 3.8–5.2)
RBC # FLD: 11.8 % — SIGNIFICANT CHANGE UP (ref 10.3–14.5)
RBC # FLD: 13.8 % — SIGNIFICANT CHANGE UP (ref 10.3–14.5)
SODIUM SERPL-SCNC: 138 MMOL/L — SIGNIFICANT CHANGE UP (ref 135–145)
SODIUM SERPL-SCNC: 141 MMOL/L — SIGNIFICANT CHANGE UP (ref 135–145)
TROPONIN T SERPL-MCNC: <0.01 NG/ML — SIGNIFICANT CHANGE UP (ref 0–0.06)
WBC # BLD: 9.2 K/UL — SIGNIFICANT CHANGE UP (ref 3.8–10.5)
WBC # BLD: 9.2 K/UL — SIGNIFICANT CHANGE UP (ref 3.8–10.5)
WBC # FLD AUTO: 9.2 K/UL — SIGNIFICANT CHANGE UP (ref 3.8–10.5)
WBC # FLD AUTO: 9.2 K/UL — SIGNIFICANT CHANGE UP (ref 3.8–10.5)

## 2017-11-10 PROCEDURE — 93010 ELECTROCARDIOGRAM REPORT: CPT

## 2017-11-10 PROCEDURE — 70450 CT HEAD/BRAIN W/O DYE: CPT | Mod: 26

## 2017-11-10 RX ORDER — SODIUM CHLORIDE 9 MG/ML
1000 INJECTION INTRAMUSCULAR; INTRAVENOUS; SUBCUTANEOUS
Qty: 0 | Refills: 0 | Status: DISCONTINUED | OUTPATIENT
Start: 2017-11-10 | End: 2017-11-11

## 2017-11-10 RX ORDER — SODIUM CHLORIDE 9 MG/ML
500 INJECTION INTRAMUSCULAR; INTRAVENOUS; SUBCUTANEOUS ONCE
Qty: 0 | Refills: 0 | Status: COMPLETED | OUTPATIENT
Start: 2017-11-10 | End: 2017-11-10

## 2017-11-10 RX ADMIN — SODIUM CHLORIDE 75 MILLILITER(S): 9 INJECTION INTRAMUSCULAR; INTRAVENOUS; SUBCUTANEOUS at 21:25

## 2017-11-10 RX ADMIN — HEPARIN SODIUM 5000 UNIT(S): 5000 INJECTION INTRAVENOUS; SUBCUTANEOUS at 17:11

## 2017-11-10 RX ADMIN — Medication 81 MILLIGRAM(S): at 11:43

## 2017-11-10 RX ADMIN — HEPARIN SODIUM 5000 UNIT(S): 5000 INJECTION INTRAVENOUS; SUBCUTANEOUS at 05:46

## 2017-11-10 RX ADMIN — Medication 650 MILLIGRAM(S): at 07:57

## 2017-11-10 RX ADMIN — BUDESONIDE AND FORMOTEROL FUMARATE DIHYDRATE 2 PUFF(S): 160; 4.5 AEROSOL RESPIRATORY (INHALATION) at 17:10

## 2017-11-10 RX ADMIN — Medication 25 MICROGRAM(S): at 05:46

## 2017-11-10 RX ADMIN — PANTOPRAZOLE SODIUM 40 MILLIGRAM(S): 20 TABLET, DELAYED RELEASE ORAL at 05:46

## 2017-11-10 RX ADMIN — CLOPIDOGREL BISULFATE 75 MILLIGRAM(S): 75 TABLET, FILM COATED ORAL at 11:43

## 2017-11-10 RX ADMIN — SIMVASTATIN 10 MILLIGRAM(S): 20 TABLET, FILM COATED ORAL at 21:25

## 2017-11-10 RX ADMIN — SODIUM CHLORIDE 1000 MILLILITER(S): 9 INJECTION INTRAMUSCULAR; INTRAVENOUS; SUBCUTANEOUS at 04:15

## 2017-11-10 RX ADMIN — BUDESONIDE AND FORMOTEROL FUMARATE DIHYDRATE 2 PUFF(S): 160; 4.5 AEROSOL RESPIRATORY (INHALATION) at 05:47

## 2017-11-10 RX ADMIN — Medication 650 MILLIGRAM(S): at 08:27

## 2017-11-10 RX ADMIN — SODIUM CHLORIDE 75 MILLILITER(S): 9 INJECTION INTRAMUSCULAR; INTRAVENOUS; SUBCUTANEOUS at 07:57

## 2017-11-10 NOTE — PROVIDER CONTACT NOTE (CHANGE IN STATUS NOTIFICATION) - ACTION/TREATMENT ORDERED:
NP will prescribe tylenol.  RN will continue to monitor.
Rapid Response called for change in mental status

## 2017-11-10 NOTE — CHART NOTE - NSCHARTNOTEFT_GEN_A_CORE
Note: patient is Sen[place]alese speaking with daughter at bedside providing translation.    Called to bedside by nursing for acute AMS.  Patient is a 83F with prior CVA without residual deficits who presented initially 4d ago for CP.  Bryant trended and were negative without gross changes to EKG.  However, patient noted to have episodes on Tele of bradycardia down to 40s and so yesterday, patient was taken by Cardiology for a LHC, which did not demonstrate CAD.  However, after LHC, patient noted to be bradycardic to 40 and required atropine.    Patient today was getting up to ambulate to the bathroom, but was unable to get up and lost bladder control.  Patient was assisted back into bed without fall or syncope.  Patient initially did not answer questions or follow commands.  No events on Tele, with patient 55-70s during initial period of AMS.  However, patient noted to be bradycardic into low 40s during RRT intermittently.  BP was WNL throughout and SBP was measured initially to be 122 and then 139.  EKG at baseline and with rate of 57.  Orthostatics were very positive, with SBP dropping from 139 lying flat to 114 after sitting up for two min with associated dizziness.  NS 500cc given.    Patient's exam was significant for improvement of mental status such that she was AAOx2 (baseline AAOx3) but following commands and nonfocal neurologically; CN were grossly intact and moving all extremities equally.  Exam otherwise significant for suprapubic TTP and fullness.  Bladder scan was performed and demonstrated retention of 457cc and so nino was placed.      Labs were drawn, including CBC with diff, CMP, comprehensive VBG, coags, and Bryant.  Recommend f/u labs, trending Bryant, and consider CTH if patient's mental status does not improve.    Cards fellow made aware of event due to recent intervention.    d/w primary team and daughter  -Gagan, PGY-3 [phone]  MAR [id number]

## 2017-11-10 NOTE — PROVIDER CONTACT NOTE (CHANGE IN STATUS NOTIFICATION) - BACKGROUND
Admitted with chest pain.  Cardiac enzymes negative. Awaiting echo and cath (positive stress test).
Adm with CP  Patient had cath yesterday via R groin, diagnostic

## 2017-11-10 NOTE — PROVIDER CONTACT NOTE (OTHER) - ASSESSMENT
Patient asymptomatic. Bradycardic event was a first time occurrence since admission to unit. Patient was sleeping at the time of the event on tele. VSS: /65, HR 50, T 97.2 oral, RR 17, spO2 97% on room air.
/78 HR 94 temp 98.3, RR18, 96% RA, asymptomatic, denies chest pain, SOB no change in mental status
s/p R groin cath, site WDL  Pt c/o chronic R knee pain, no pain at groin site  + pulses, no hematoma, no bleeding noted  Patient already received Tylenol about 1 hour ago, with partial relief

## 2017-11-10 NOTE — CHART NOTE - NSCHARTNOTEFT_GEN_A_CORE
MEDICINE PA POST RRT Note      Patient is a 83y old  Female who presents with a chief complaint of Chest pain (06 Nov 2017 13:59)    Event Summary: RRT called for AMS after patient was found sitting at edge of bed soaked in urine. Found to be retaining >400cc urine on bladder scan, with +orthostatics when going from laying to sitting up position. Connor inserted and patient given 500 cc bolus of fluids.           Vital Signs Last 24 Hrs  T(C): 36.9 (10 Nov 2017 05:14), Max: 36.9 (09 Nov 2017 20:39)  T(F): 98.4 (10 Nov 2017 05:14), Max: 98.4 (09 Nov 2017 20:39)  HR: 56 (10 Nov 2017 05:14) (53 - 67)  BP: 117/67 (10 Nov 2017 05:14) (107/70 - 142/85)  BP(mean): --  RR: 209 (10 Nov 2017 05:14) (16 - 209)  SpO2: 96% (09 Nov 2017 20:39) (96% - 100%)    Physical Assessment:  General: Awake, No acute distress.   Neurology: A&Ox3, nonfocal  CV: +S1S2, RRR.  No peripheral edema  Respiratory: Even, unlabored.  CTA B/L.    Abdomen:  +BS.  Soft, NT, ND.  No palpable mass  MSK: EVANS x4.   Skin: Warm and Dry         A/P:                Yamileth Bailey PA-C   [facility] MEDICINE PA POST RRT Note      Patient is a 83y old  Female who presents with a chief complaint of Chest pain (06 Nov 2017 13:59)    Event Summary: RRT called for AMS after patient was found sitting at edge of bed soaked in urine. Found to be retaining >400cc urine on bladder scan, with +orthostatics when going from laying to sitting up position. Connor inserted and patient given 500 cc bolus of fluids. Pt examined at bedside by me. States that she feels fine and does not remember the RRT this morning. Denies CP, N, V, dizziness, abdominal pain, changes in vision, confusion.     Vital Signs Last 24 Hrs  T(C): 36.9 (10 Nov 2017 05:14), Max: 36.9 (09 Nov 2017 20:39)  T(F): 98.4 (10 Nov 2017 05:14), Max: 98.4 (09 Nov 2017 20:39)  HR: 56 (10 Nov 2017 05:14) (53 - 67)  BP: 117/67 (10 Nov 2017 05:14) (107/70 - 142/85)  RR: 209 (10 Nov 2017 05:14) (16 - 209)  SpO2: 96% (09 Nov 2017 20:39) (96% - 100%)    Physical Assessment:  General: Awake, No acute distress.   Neurology: A&Ox3. No focal neuro deficits. PERRLA. EOMI. tongue midline, no fasciculations, no facial droop. No pronator drift. Sensation intact and pulses intact b/l upper extremities and b/l lower extremities.    CV: +S1S2, RRR.    Respiratory: Even, unlabored.  CTA B/L.    Abdomen:  +BS.  Soft, NT, ND.  No palpable mass  MSK: R lower extremity swelling   Skin: Warm and Dry         A/P:                Yamileth Bailey PA-C   [facility] MEDICINE PA POST RRT Note      Patient is a 83y old  Female who presents with a chief complaint of Chest pain (06 Nov 2017 13:59)    Event Summary: RRT called for AMS after patient was found sitting at edge of bed soaked in urine. Found to be retaining >400cc urine on bladder scan, with +orthostatics when going from laying to sitting up position. Connor inserted and patient given 500 cc bolus of fluids. Pt examined at bedside by me. States that she feels fine and does not remember the RRT this morning. Denies CP, N, V, dizziness, abdominal pain, changes in vision, confusion.     Vital Signs Last 24 Hrs  T(C): 36.9 (10 Nov 2017 05:14), Max: 36.9 (09 Nov 2017 20:39)  T(F): 98.4 (10 Nov 2017 05:14), Max: 98.4 (09 Nov 2017 20:39)  HR: 56 (10 Nov 2017 05:14) (53 - 67)  BP: 117/67 (10 Nov 2017 05:14) (107/70 - 142/85)  RR: 209 (10 Nov 2017 05:14) (16 - 209)  SpO2: 96% (09 Nov 2017 20:39) (96% - 100%)    Physical Assessment:  General: Awake, No acute distress.   Neurology: A&Ox3. No focal neuro deficits. PERRLA. EOMI. tongue midline, no fasciculations, no facial droop. No pronator drift. Sensation/pulses/strength intact b/l upper extremities and b/l lower extremities.    CV: +S1S2, erasmo   Respiratory: Even, unlabored.    Abdomen:   Soft, NT, ND.  No palpable mass  MSK: R lower extremity swelling   Skin: Warm and Dry     A/P:  84 yo female with PMH HTN, hypothyroidism, asthma, prior CVA, admitted with CP s/p diagnostic cath. RRT called     Denies HA, syncope, abdominal pain, hematuria, melena, fevers or leg edema.    Daughter phone number to assist with translation is [phone] (06 Nov 2017 13:59)                Yamileth Bailey PA-C   Spectra [id number] MEDICINE PA POST RRT Note    Patient is a 83y old  Female who presents with a chief complaint of Chest pain (06 Nov 2017 13:59)    Event Summary: RRT called for AMS after patient was found sitting at edge of bed soaked in urine. Found to be retaining >400cc urine on bladder scan, with +orthostatics when going from laying to sitting up position. Connor inserted and patient given 500 cc bolus of fluids. Pt examined at bedside by me. States that she feels fine and does not remember the RRT this morning. Denies CP, SOB, N, V, dizziness, abdominal pain, changes in vision, confusion.     Vital Signs Last 24 Hrs  T(C): 36.9 (10 Nov 2017 05:14), Max: 36.9 (09 Nov 2017 20:39)  T(F): 98.4 (10 Nov 2017 05:14), Max: 98.4 (09 Nov 2017 20:39)  HR: 56 (10 Nov 2017 05:14) (53 - 67)  BP: 117/67 (10 Nov 2017 05:14) (107/70 - 142/85)  RR: 209 (10 Nov 2017 05:14) (16 - 209)  SpO2: 96% (09 Nov 2017 20:39) (96% - 100%)    Physical Assessment:  General: Awake, No acute distress.   Neurology: A&Ox3. No focal neuro deficits. PERRLA. EOMI. tongue midline, no fasciculations, no facial droop. No pronator drift. Sensation/pulses/strength intact b/l upper extremities and b/l lower extremities.    CV: +S1S2, erasmo   Respiratory: Even, unlabored.    Abdomen:   Soft, NT, ND.    MSK: R lower extremity swelling   Skin: Warm and Dry     A/P:  84 yo female with PMH HTN, hypothyroidism, asthma, prior CVA, admitted with CP s/p diagnostic cath. RRT called for ?AMS bradycardia.   1. AMS most likely secondary to presyncope vs. vagal response to urine retention  - Urgent CTH ordered r/o underlying acute neuro etiology  - Neuro consult called- Dr. Murry     2. Orthostatic hypotension  - 500 cc bolus given during RRT. Pt started on NS @ 75 cc/hr   - Repeat orthostatics   - Hold BP meds   - Monitor urine output     3. Bradycardia s/p diagnostic cath   - Continue to monitor HR on tele   - Follow up CE   -   Discussed with Dr. Narayanan. Discussed with family at bedside. Endorsed to primary team in AM.     Yamileth Bailey PA-C   Spectra [id number] MEDICINE PA POST RRT Note    Patient is a 83y old  Female who presents with a chief complaint of Chest pain (06 Nov 2017 13:59)    Event Summary: RRT called for AMS after patient was found sitting at edge of bed soaked in urine. Found to be retaining >400cc urine on bladder scan, with +orthostatics when going from laying to sitting up position. Connor inserted and patient given 500 cc bolus of fluids. Pt examined at bedside by me. States that she feels fine and does not remember the RRT this morning. Denies CP, SOB, N, V, dizziness, abdominal pain, changes in vision, confusion.     Vital Signs Last 24 Hrs  T(C): 36.9 (10 Nov 2017 05:14), Max: 36.9 (09 Nov 2017 20:39)  T(F): 98.4 (10 Nov 2017 05:14), Max: 98.4 (09 Nov 2017 20:39)  HR: 56 (10 Nov 2017 05:14) (53 - 67)  BP: 117/67 (10 Nov 2017 05:14) (107/70 - 142/85)  RR: 209 (10 Nov 2017 05:14) (16 - 209)  SpO2: 96% (09 Nov 2017 20:39) (96% - 100%)    Physical Assessment:  General: Awake, No acute distress.   Neurology: A&Ox3. No focal neuro deficits. PERRLA. EOMI. tongue midline, no fasciculations, no facial droop. No pronator drift. Sensation/pulses/strength intact b/l upper extremities and b/l lower extremities.    CV: +S1S2, erasmo   Respiratory: Even, unlabored.    Abdomen:   Soft, NT, ND.    MSK: R lower extremity swelling   Skin: Warm and Dry     A/P:  84 yo female with PMH HTN, hypothyroidism, asthma, prior CVA, admitted with CP s/p diagnostic cath. RRT called for ?AMS bradycardia.   1. AMS most likely secondary to presyncope vs. vagal response to urine retention  - Urgent CTH ordered r/o underlying acute neuro etiology  - No focal neuro defecits on physical exam  - Neuro consult called- Dr. Murry     2. Orthostatic hypotension  - 500 cc bolus given during RRT. Pt started on NS @ 75 cc/hr   - Repeat orthostatics   - Hold BP meds   - Monitor urine output     3. Bradycardia s/p diagnostic cath   - Continue to monitor HR on tele   - Follow up CE   -   Discussed with Dr. Narayanan. Discussed with family at bedside. Endorsed to primary team in AM.     Yamileth Bailey PA-C   Spectra [id number]

## 2017-11-10 NOTE — PROVIDER CONTACT NOTE (CHANGE IN STATUS NOTIFICATION) - ASSESSMENT
Afebrile.  Reports arthritic pain in right knee.  Warm pack applied. Requesting oral pain management
Patient found sitting at edge of bed, soaked in urine, with change in mental status

## 2017-11-10 NOTE — PROVIDER CONTACT NOTE (OTHER) - SITUATION
Patient bradycardic briefly on telemetry monitor to 46, sustaining low 50s at this time in normal sinus rhythm
/78 HR 94
Pt s/p R groin cath, c/o r knee pain she has chronically  Requesting further pain management

## 2017-11-10 NOTE — PROGRESS NOTE ADULT - ASSESSMENT
pt  with  chest  pressure,  resolved  tele, sinus  bradycardia   waiting echo/   stress test, normal  ef,  abnormal, Tid   s.p cath, non obstructive  on statin, coxaar,  hctz.   unable  to add  lopressor, hr  was  45  seen by card  spoke  with daughters  events  noted, pt  with brief   altered  mental  status, awaiting  ct  head   monitor  on megan, for  bradycardia    has  trung  for retention, tov  in  am pt  with  chest  pressure,  resolved  tele, sinus  bradycardia   waiting echo/   stress test, normal  ef,  abnormal, Tid   s.p cath, non obstructive  on statin, coxaar,  hctz.   unable  to add  lopressor, hr  was  45  seen by card  spoke  with daughters  events  noted, pt  with brief   altered  mental  status, awaiting  ct  head   monitor  on megan, for  bradycardia    has  nino  for retention, tov  in  am  sbp, is  lower,  bp  mes  stopped,  orthostatic, on iv fulids  now

## 2017-11-10 NOTE — CONSULT NOTE ADULT - SUBJECTIVE AND OBJECTIVE BOX
84yo [place] woman PMH CVA with no residual deficits, HTN, hypothyroidism admitted for chest pain s/p cardiac cath yesterday; Neuro consulted for episode of AMS last night. At baseline, pt is A&Ox3. Per EMR, post-cath, pt was attempting to get up to go to the bathroom. Pt was unable to get up and lost bladder control. When she was assisted back to bed, pt was noted to be not answering questions or commands. RRT was called by team. Initially HR was 55-70s, but dropped to 30-40s with BP 98/50. Per RRT note, pt was found to be orthostatic ( lying --> 114 sitting after 2 min with associated dizziness). She was given IV fluids and Atropine with improvement in mental status.     Of note, during RRT, pt was found to have urinary retention of 457cc.     PAST MEDICAL & SURGICAL HISTORY:  Hypothyroidism  CVA (cerebral vascular accident)  Hypertension  H/O tubal ligation      Allergies  penicillin (Unknown)      MEDICATIONS  (STANDING):  aspirin enteric coated 81 milliGRAM(s) Oral daily  buDESOnide 160 MICROgram(s)/formoterol 4.5 MICROgram(s) Inhaler 2 Puff(s) Inhalation two times a day  clopidogrel Tablet 75 milliGRAM(s) Oral daily  heparin  Injectable 5000 Unit(s) SubCutaneous every 12 hours  levothyroxine 25 MICROGram(s) Oral daily  pantoprazole    Tablet 40 milliGRAM(s) Oral before breakfast  simvastatin 10 milliGRAM(s) Oral at bedtime  sodium chloride 0.9%. 1000 milliLiter(s) (75 mL/Hr) IV Continuous <Continuous>    MEDICATIONS  (PRN):  acetaminophen   Tablet. 650 milliGRAM(s) Oral every 6 hours PRN Mild Pain (1 - 3)  ALBUTerol    90 MICROgram(s) HFA Inhaler 2 Puff(s) Inhalation every 6 hours PRN Shortness of Breath and/or Wheezing      FAMILY HISTORY:  noncontributory      Social History: Denies tob, EtOH use     Physical Exam:   Vital Signs Last 24 Hrs  T(C): 36.7 (10 Nov 2017 08:41), Max: 36.9 (09 Nov 2017 20:39)  T(F): 98.1 (10 Nov 2017 08:41), Max: 98.4 (09 Nov 2017 20:39)  HR: 62 (10 Nov 2017 08:41) (53 - 66)  BP: 123/68 (10 Nov 2017 08:41) (107/70 - 142/85)  BP(mean): --  RR: 16 (10 Nov 2017 08:41) (16 - 209)  SpO2: 96% (10 Nov 2017 08:41) (96% - 100%)    General Exam:   General appearance: No acute distress                 Neurological Exam:  Mental Status: AAOx3, fluent speech, follows simple commands, able to name  Cranial Nerves: EOMI, PERRL, VFF, V1-V3 intact, facial symmetric, no dysarthria, tongue midline  Motor: strength 5/5 throughout. No drift x4  Sensation: Intact to LT throughout  Coordination: FTN intact b/l  Reflexes: 1+ bilateral biceps, brachioradialis, patellar and ankle  Gait: normal and stable.      Labs:     CBC Full  -  ( 10 Nov 2017 07:25 )  WBC Count : 9.20 K/uL  Hemoglobin : 10.3 g/dL  Hematocrit : 30.6 %  Platelet Count - Automated : 245 K/uL  Mean Cell Volume : 91.3 fl  Mean Cell Hemoglobin : 30.7 pg  Mean Cell Hemoglobin Concentration : 33.7 gm/dL  Auto Neutrophil # : x  Auto Lymphocyte # : x  Auto Monocyte # : x  Auto Eosinophil # : x  Auto Basophil # : x  Auto Neutrophil % : x  Auto Lymphocyte % : x  Auto Monocyte % : x  Auto Eosinophil % : x  Auto Basophil % : x    11-10    141  |  102  |  25<H>  ----------------------------<  114<H>  4.3   |  25  |  1.34<H>    Ca    9.1      10 Nov 2017 07:29  Phos  5.3     11-10  Mg     2.1     11-10    TPro  7.5  /  Alb  4.5  /  TBili  0.2  /  DBili  x   /  AST  20  /  ALT  15  /  AlkPhos  61  11-10    LIVER FUNCTIONS - ( 10 Nov 2017 03:52 )  Alb: 4.5 g/dL / Pro: 7.5 g/dL / ALK PHOS: 61 U/L / ALT: 15 U/L RC / AST: 20 U/L / GGT: x           PT/INR - ( 10 Nov 2017 03:52 )   PT: 10.5 sec;   INR: 0.97 ratio               Imaging:     CT Head: pending 82yo [place] woman PMH CVA with no residual deficits, HTN, hypothyroidism, arthritis with R knee pain admitted for chest pain s/p cardiac cath yesterday; Neuro consulted for episode of AMS last night. At baseline, pt is A&Ox3 and ambulates independently. Per pt's daughter at bedside, post-cath, pt was attempting to get up to go to the bathroom. Pt was unable to make it to the bathroom and lost bladder control. When she was assisted back to bed, pt was noted to be not answering questions or commands. Pt reports feeling dizzy/faint at the time with b/l LE weakness. No associated HA, vision changes, speech complaints by the pt. She endorses intact awareness during the event. RRT was called by team. Initially HR was 55-70s, but dropped to 30-40s with BP 98/50. Per RRT note, pt was found to be orthostatic ( lying --> 114 sitting after 2 min with associated dizziness). She was given IV fluids and Atropine with improvement in mental status.     Of note, during RRT, pt was found to have urinary retention of 457cc.     PAST MEDICAL & SURGICAL HISTORY:  Hypothyroidism  CVA (cerebral vascular accident)  Hypertension  H/O tubal ligation      Allergies  penicillin (Unknown)      MEDICATIONS  (STANDING):  aspirin enteric coated 81 milliGRAM(s) Oral daily  buDESOnide 160 MICROgram(s)/formoterol 4.5 MICROgram(s) Inhaler 2 Puff(s) Inhalation two times a day  clopidogrel Tablet 75 milliGRAM(s) Oral daily  heparin  Injectable 5000 Unit(s) SubCutaneous every 12 hours  levothyroxine 25 MICROGram(s) Oral daily  pantoprazole    Tablet 40 milliGRAM(s) Oral before breakfast  simvastatin 10 milliGRAM(s) Oral at bedtime  sodium chloride 0.9%. 1000 milliLiter(s) (75 mL/Hr) IV Continuous <Continuous>    MEDICATIONS  (PRN):  acetaminophen   Tablet. 650 milliGRAM(s) Oral every 6 hours PRN Mild Pain (1 - 3)  ALBUTerol    90 MICROgram(s) HFA Inhaler 2 Puff(s) Inhalation every 6 hours PRN Shortness of Breath and/or Wheezing      FAMILY HISTORY:  noncontributory      Social History: Denies tob, EtOH use     Physical Exam:   Vital Signs Last 24 Hrs  T(C): 36.7 (10 Nov 2017 08:41), Max: 36.9 (09 Nov 2017 20:39)  T(F): 98.1 (10 Nov 2017 08:41), Max: 98.4 (09 Nov 2017 20:39)  HR: 62 (10 Nov 2017 08:41) (53 - 66)  BP: 123/68 (10 Nov 2017 08:41) (107/70 - 142/85)  BP(mean): --  RR: 16 (10 Nov 2017 08:41) (16 - 209)  SpO2: 96% (10 Nov 2017 08:41) (96% - 100%)    General Exam:   General appearance: No acute distress                 Neurological Exam:  Mental Status: AAOx3, fluent speech, follows simple commands, able to name  Cranial Nerves: EOMI, VFF, V1-V3 intact, facial symmetric, no dysarthria, tongue midline  Motor: No drift x4, RLE pain-limited 4/5, b/l UE and LLE 5/5  Sensation: Intact to LT throughout  Coordination: FTN intact b/l      Labs:     CBC Full  -  ( 10 Nov 2017 07:25 )  WBC Count : 9.20 K/uL  Hemoglobin : 10.3 g/dL  Hematocrit : 30.6 %  Platelet Count - Automated : 245 K/uL  Mean Cell Volume : 91.3 fl  Mean Cell Hemoglobin : 30.7 pg  Mean Cell Hemoglobin Concentration : 33.7 gm/dL  Auto Neutrophil # : x  Auto Lymphocyte # : x  Auto Monocyte # : x  Auto Eosinophil # : x  Auto Basophil # : x  Auto Neutrophil % : x  Auto Lymphocyte % : x  Auto Monocyte % : x  Auto Eosinophil % : x  Auto Basophil % : x    11-10    141  |  102  |  25<H>  ----------------------------<  114<H>  4.3   |  25  |  1.34<H>    Ca    9.1      10 Nov 2017 07:29  Phos  5.3     11-10  Mg     2.1     11-10    TPro  7.5  /  Alb  4.5  /  TBili  0.2  /  DBili  x   /  AST  20  /  ALT  15  /  AlkPhos  61  11-10    LIVER FUNCTIONS - ( 10 Nov 2017 03:52 )  Alb: 4.5 g/dL / Pro: 7.5 g/dL / ALK PHOS: 61 U/L / ALT: 15 U/L RC / AST: 20 U/L / GGT: x           PT/INR - ( 10 Nov 2017 03:52 )   PT: 10.5 sec;   INR: 0.97 ratio               Imaging:     CT Head: pending

## 2017-11-10 NOTE — CONSULT NOTE ADULT - ATTENDING COMMENTS
As above.  Exam this am reveals pt awake, alert, and attentive to examiner.  No VF deficits and no focal CN deficits apparent.  Power symmetric in ues and les.  Hx c/w syncope in the setting of transient hypotension.  CT reviewed and right int capsular lacune of indeterminate age noted.  Would proceed with MRI to R/O any new ischemia.

## 2017-11-10 NOTE — PROVIDER CONTACT NOTE (OTHER) - BACKGROUND
Patient admitted for chest pain, cardiac enzymes negative x3. Stress test abnormal. LE dopplers negative for DVT. Pending echo.
Adm with chest pain  HR 30s s/p sheath removal in cath lab -- NS bolus and 1 amp atropine was given with improvement
dx chest pain

## 2017-11-11 LAB
ANION GAP SERPL CALC-SCNC: 15 MMOL/L — SIGNIFICANT CHANGE UP (ref 5–17)
BUN SERPL-MCNC: 18 MG/DL — SIGNIFICANT CHANGE UP (ref 7–23)
CALCIUM SERPL-MCNC: 8.8 MG/DL — SIGNIFICANT CHANGE UP (ref 8.4–10.5)
CHLORIDE SERPL-SCNC: 104 MMOL/L — SIGNIFICANT CHANGE UP (ref 96–108)
CO2 SERPL-SCNC: 22 MMOL/L — SIGNIFICANT CHANGE UP (ref 22–31)
CREAT SERPL-MCNC: 1.05 MG/DL — SIGNIFICANT CHANGE UP (ref 0.5–1.3)
GLUCOSE SERPL-MCNC: 106 MG/DL — HIGH (ref 70–99)
HCT VFR BLD CALC: 30.6 % — LOW (ref 34.5–45)
HGB BLD-MCNC: 10.3 G/DL — LOW (ref 11.5–15.5)
MCHC RBC-ENTMCNC: 30.8 PG — SIGNIFICANT CHANGE UP (ref 27–34)
MCHC RBC-ENTMCNC: 33.7 GM/DL — SIGNIFICANT CHANGE UP (ref 32–36)
MCV RBC AUTO: 91.6 FL — SIGNIFICANT CHANGE UP (ref 80–100)
PLATELET # BLD AUTO: 237 K/UL — SIGNIFICANT CHANGE UP (ref 150–400)
POTASSIUM SERPL-MCNC: 4.4 MMOL/L — SIGNIFICANT CHANGE UP (ref 3.5–5.3)
POTASSIUM SERPL-SCNC: 4.4 MMOL/L — SIGNIFICANT CHANGE UP (ref 3.5–5.3)
RBC # BLD: 3.34 M/UL — LOW (ref 3.8–5.2)
RBC # FLD: 13.9 % — SIGNIFICANT CHANGE UP (ref 10.3–14.5)
SODIUM SERPL-SCNC: 141 MMOL/L — SIGNIFICANT CHANGE UP (ref 135–145)
WBC # BLD: 8.24 K/UL — SIGNIFICANT CHANGE UP (ref 3.8–10.5)
WBC # FLD AUTO: 8.24 K/UL — SIGNIFICANT CHANGE UP (ref 3.8–10.5)

## 2017-11-11 PROCEDURE — 70551 MRI BRAIN STEM W/O DYE: CPT | Mod: 26

## 2017-11-11 RX ORDER — AMLODIPINE BESYLATE 2.5 MG/1
5 TABLET ORAL DAILY
Qty: 0 | Refills: 0 | Status: DISCONTINUED | OUTPATIENT
Start: 2017-11-11 | End: 2017-11-13

## 2017-11-11 RX ORDER — LOSARTAN POTASSIUM 100 MG/1
50 TABLET, FILM COATED ORAL DAILY
Qty: 0 | Refills: 0 | Status: DISCONTINUED | OUTPATIENT
Start: 2017-11-11 | End: 2017-11-13

## 2017-11-11 RX ADMIN — PANTOPRAZOLE SODIUM 40 MILLIGRAM(S): 20 TABLET, DELAYED RELEASE ORAL at 05:19

## 2017-11-11 RX ADMIN — BUDESONIDE AND FORMOTEROL FUMARATE DIHYDRATE 2 PUFF(S): 160; 4.5 AEROSOL RESPIRATORY (INHALATION) at 17:24

## 2017-11-11 RX ADMIN — AMLODIPINE BESYLATE 5 MILLIGRAM(S): 2.5 TABLET ORAL at 11:41

## 2017-11-11 RX ADMIN — Medication 25 MICROGRAM(S): at 05:19

## 2017-11-11 RX ADMIN — SIMVASTATIN 10 MILLIGRAM(S): 20 TABLET, FILM COATED ORAL at 22:41

## 2017-11-11 RX ADMIN — CLOPIDOGREL BISULFATE 75 MILLIGRAM(S): 75 TABLET, FILM COATED ORAL at 11:41

## 2017-11-11 RX ADMIN — HEPARIN SODIUM 5000 UNIT(S): 5000 INJECTION INTRAVENOUS; SUBCUTANEOUS at 17:23

## 2017-11-11 RX ADMIN — Medication 650 MILLIGRAM(S): at 07:18

## 2017-11-11 RX ADMIN — BUDESONIDE AND FORMOTEROL FUMARATE DIHYDRATE 2 PUFF(S): 160; 4.5 AEROSOL RESPIRATORY (INHALATION) at 05:19

## 2017-11-11 RX ADMIN — SODIUM CHLORIDE 75 MILLILITER(S): 9 INJECTION INTRAMUSCULAR; INTRAVENOUS; SUBCUTANEOUS at 05:19

## 2017-11-11 RX ADMIN — HEPARIN SODIUM 5000 UNIT(S): 5000 INJECTION INTRAVENOUS; SUBCUTANEOUS at 05:19

## 2017-11-11 RX ADMIN — LOSARTAN POTASSIUM 50 MILLIGRAM(S): 100 TABLET, FILM COATED ORAL at 11:41

## 2017-11-11 RX ADMIN — Medication 81 MILLIGRAM(S): at 11:41

## 2017-11-11 RX ADMIN — Medication 650 MILLIGRAM(S): at 06:53

## 2017-11-11 NOTE — PROGRESS NOTE ADULT - ASSESSMENT
pt  with  chest  pressure,  resolved  tele, sinus  bradycardia   stress test, normal  ef,  abnormal, Tid   s.p cath, non obstructive  on statin,    unable  to add  lopressor, hr  was  45  seen by card  spoke  with daughters  events  noted, pt  with brief   altered  mental  status,   ct  head .small  lacunar  infarct   monitor  on megan, for  bradycardia  sbp better with  iv fluids, darlene restart bp med

## 2017-11-12 LAB
ANION GAP SERPL CALC-SCNC: 15 MMOL/L — SIGNIFICANT CHANGE UP (ref 5–17)
BUN SERPL-MCNC: 15 MG/DL — SIGNIFICANT CHANGE UP (ref 7–23)
CALCIUM SERPL-MCNC: 9.7 MG/DL — SIGNIFICANT CHANGE UP (ref 8.4–10.5)
CHLORIDE SERPL-SCNC: 105 MMOL/L — SIGNIFICANT CHANGE UP (ref 96–108)
CO2 SERPL-SCNC: 21 MMOL/L — LOW (ref 22–31)
CREAT SERPL-MCNC: 1.01 MG/DL — SIGNIFICANT CHANGE UP (ref 0.5–1.3)
GLUCOSE SERPL-MCNC: 114 MG/DL — HIGH (ref 70–99)
HCT VFR BLD CALC: 30.8 % — LOW (ref 34.5–45)
HGB BLD-MCNC: 10.5 G/DL — LOW (ref 11.5–15.5)
MCHC RBC-ENTMCNC: 31.4 PG — SIGNIFICANT CHANGE UP (ref 27–34)
MCHC RBC-ENTMCNC: 34.1 GM/DL — SIGNIFICANT CHANGE UP (ref 32–36)
MCV RBC AUTO: 92.2 FL — SIGNIFICANT CHANGE UP (ref 80–100)
PLATELET # BLD AUTO: 236 K/UL — SIGNIFICANT CHANGE UP (ref 150–400)
POTASSIUM SERPL-MCNC: 4.3 MMOL/L — SIGNIFICANT CHANGE UP (ref 3.5–5.3)
POTASSIUM SERPL-SCNC: 4.3 MMOL/L — SIGNIFICANT CHANGE UP (ref 3.5–5.3)
RBC # BLD: 3.34 M/UL — LOW (ref 3.8–5.2)
RBC # FLD: 13.9 % — SIGNIFICANT CHANGE UP (ref 10.3–14.5)
SODIUM SERPL-SCNC: 141 MMOL/L — SIGNIFICANT CHANGE UP (ref 135–145)
WBC # BLD: 6.46 K/UL — SIGNIFICANT CHANGE UP (ref 3.8–10.5)
WBC # FLD AUTO: 6.46 K/UL — SIGNIFICANT CHANGE UP (ref 3.8–10.5)

## 2017-11-12 RX ADMIN — PANTOPRAZOLE SODIUM 40 MILLIGRAM(S): 20 TABLET, DELAYED RELEASE ORAL at 05:41

## 2017-11-12 RX ADMIN — BUDESONIDE AND FORMOTEROL FUMARATE DIHYDRATE 2 PUFF(S): 160; 4.5 AEROSOL RESPIRATORY (INHALATION) at 17:22

## 2017-11-12 RX ADMIN — BUDESONIDE AND FORMOTEROL FUMARATE DIHYDRATE 2 PUFF(S): 160; 4.5 AEROSOL RESPIRATORY (INHALATION) at 05:41

## 2017-11-12 RX ADMIN — LOSARTAN POTASSIUM 50 MILLIGRAM(S): 100 TABLET, FILM COATED ORAL at 05:41

## 2017-11-12 RX ADMIN — AMLODIPINE BESYLATE 5 MILLIGRAM(S): 2.5 TABLET ORAL at 05:41

## 2017-11-12 RX ADMIN — Medication 81 MILLIGRAM(S): at 11:20

## 2017-11-12 RX ADMIN — HEPARIN SODIUM 5000 UNIT(S): 5000 INJECTION INTRAVENOUS; SUBCUTANEOUS at 05:41

## 2017-11-12 RX ADMIN — Medication 25 MICROGRAM(S): at 05:41

## 2017-11-12 RX ADMIN — HEPARIN SODIUM 5000 UNIT(S): 5000 INJECTION INTRAVENOUS; SUBCUTANEOUS at 17:22

## 2017-11-12 RX ADMIN — Medication 650 MILLIGRAM(S): at 22:36

## 2017-11-12 RX ADMIN — SIMVASTATIN 10 MILLIGRAM(S): 20 TABLET, FILM COATED ORAL at 22:04

## 2017-11-12 RX ADMIN — CLOPIDOGREL BISULFATE 75 MILLIGRAM(S): 75 TABLET, FILM COATED ORAL at 11:20

## 2017-11-12 RX ADMIN — Medication 650 MILLIGRAM(S): at 22:06

## 2017-11-12 NOTE — PHYSICAL THERAPY INITIAL EVALUATION ADULT - ACTIVE RANGE OF MOTION EXAMINATION, REHAB EVAL
bilateral upper extremity Active ROM was WFL (within functional limits)/bilateral  lower extremity Active ROM was WFL (within functional limits)/except R shoulder flexion 100deg

## 2017-11-12 NOTE — PHYSICAL THERAPY INITIAL EVALUATION ADULT - PRECAUTIONS/LIMITATIONS, REHAB EVAL
fall precautions/cont...s/p RRT 11/10 for AMS with bradycardia (30-40s) & orthosasis most likely due to presyncope vs. vagal response to urine retention. CTH 11/10: Small lacunar type infarct at the level of the genu of the internal capsule on the R age indeterminate

## 2017-11-12 NOTE — PROGRESS NOTE ADULT - ASSESSMENT
pt  with  chest  pressure,  resolved  tele, sinus  bradycardia   stress test, normal  ef,  abnormal, Tid   s.p cath, non obstructive  on statin,    unable  to add  lopressor, hr  was  45  seen by card  spoke  with daughters  events  noted, pt  with brief   altered  mental  status,   ct  head .small  lacunar  infarct   monitor  on megan, for  bradycardia  sbp better with  iv fluids, darlene restart bp med  anemia, no melena/ brbpr pt  with  chest  pressure,  resolved  tele, sinus  bradycardia   stress test, normal  ef,  abnormal, Tid   s.p cath, non obstructive  on statin,    unable  to add  lopressor, hr  was  45  seen by card  spoke  with daughters  events  noted, pt  with brief   altered  mental  status,   ct  head .small  lacunar  infarct   monitor  on megan, for  bradycardia  sbp better with  iv fluids, darlene restart bp med  anemia, no melena/ brbpr, will call  gi

## 2017-11-12 NOTE — PHYSICAL THERAPY INITIAL EVALUATION ADULT - ADDITIONAL COMMENTS
Citizen of [place] speaking [place] speaking  lives in private house with children, 5 steps to enter with bilateral rails (far apart), 1 flight inside with 1 rail, right hand dominant, uses straight cane (held in R hand), bathtub

## 2017-11-12 NOTE — PHYSICAL THERAPY INITIAL EVALUATION ADULT - GENERAL OBSERVATIONS, REHAB EVAL
received supine with head of bed elevated +2 daughters and son in law at bed side, hot packs on R knee

## 2017-11-12 NOTE — CONSULT NOTE ADULT - SUBJECTIVE AND OBJECTIVE BOX
Patient is a 83y old  Female who presents with a chief complaint of Chest pain (06 Nov 2017 13:59)      HPI:  84 yo female with PMH HTN, hypothyroidism, asthma, prior CVA, to ER with constant crampy squeezing chest pain which radiates to the back associated with chest pain.  Symptoms have been going on since Friday but worsened this AM.  Received flu vaccine one week ago.  Still endorses chest pain with palpation of chest wall. No angina. States that it is in the epigastric region with radiation to the right shoulder.  Pain worse with movement, eating and taking a deep breath.  Noted to have anemia on labs.  No melena or BRBPR.  Previous colonoscopy was 3 years ago in [place].        PAST MEDICAL & SURGICAL HISTORY:  Hypothyroidism  CVA (cerebral vascular accident)  Hypertension  H/O tubal ligation      Allergies    penicillin (Unknown)    Intolerances        MEDICATIONS  (STANDING):  amLODIPine   Tablet 5 milliGRAM(s) Oral daily  aspirin enteric coated 81 milliGRAM(s) Oral daily  buDESOnide 160 MICROgram(s)/formoterol 4.5 MICROgram(s) Inhaler 2 Puff(s) Inhalation two times a day  clopidogrel Tablet 75 milliGRAM(s) Oral daily  heparin  Injectable 5000 Unit(s) SubCutaneous every 12 hours  levothyroxine 25 MICROGram(s) Oral daily  losartan 50 milliGRAM(s) Oral daily  pantoprazole    Tablet 40 milliGRAM(s) Oral before breakfast  simvastatin 10 milliGRAM(s) Oral at bedtime    MEDICATIONS  (PRN):  acetaminophen   Tablet. 650 milliGRAM(s) Oral every 6 hours PRN Mild Pain (1 - 3)  ALBUTerol    90 MICROgram(s) HFA Inhaler 2 Puff(s) Inhalation every 6 hours PRN Shortness of Breath and/or Wheezing      SOCIAL HX:  Substance Use (street drugs): (  ) never used  (  ) other:  Tobacco Usage:  (   ) never smoked   (   ) former smoker   (   ) current smoker    Alcohol Usage:    FAMILY HISTORY:  No pertinent family history in first degree relatives      Review of Systems:    General:  No wt loss, fevers, chills, night sweats,fatigue,   CV:  No pain, palpitatioins, hypo/hypertension  Resp:  No dyspnea, cough, tachypnea, wheezing  GI:  No pain, No nausea, No vomiting, No diarrhea, No constipatiion, No weight loss, No fever, No pruritis, No rectal bleeding, No tarry stools, No dysphagia,  :  No pain, bleeding, incontinence, nocturia  Muscle:  No pain, weakness  Neuro:  No weakness, tingling, memory problems  Heme:  No petechiae, ecchymosis, easy bruisability  Skin:  No rash, tattoos, scars, edema    PHYSICAL EXAM:  Vital Signs Last 24 Hrs  T(C): 36.8 (12 Nov 2017 16:01), Max: 36.9 (12 Nov 2017 04:27)  T(F): 98.2 (12 Nov 2017 16:01), Max: 98.5 (12 Nov 2017 04:27)  HR: 64 (12 Nov 2017 16:01) (57 - 64)  BP: 136/72 (12 Nov 2017 16:01) (113/63 - 136/72)  BP(mean): --  RR: 18 (12 Nov 2017 16:01) (18 - 18)  SpO2: 98% (12 Nov 2017 16:01) (95% - 98%)  Constitutional: NAD, well-developed  Neck: No LAD, supple  Respiratory: CTA and P  Cardiovascular: S1 and S2, RRR, no M  Gastrointestinal: BS+, soft, NT/ND, neg HSM,  Extremities: No peripheral edema, neg clubing, cyanosis  Vascular: 2+ peripheral pulses  Neurological: A/O x 3, no focal deficits  Psychiatric: Normal mood, normal affect  Skin: No rashes      LABS:                        10.5   6.46  )-----------( 236      ( 12 Nov 2017 08:48 )             30.8     11-12    141  |  105  |  15  ----------------------------<  114<H>  4.3   |  21<L>  |  1.01    Ca    9.7      12 Nov 2017 08:55          LIVER FUNCTIONS - ( 10 Nov 2017 03:52 )  Alb: 4.5 g/dL / Pro: 7.5 g/dL / ALK PHOS: 61 U/L / ALT: 15 U/L RC / AST: 20 U/L / GGT: x

## 2017-11-12 NOTE — PROGRESS NOTE ADULT - ASSESSMENT
Doing well from a neurologic standpoint.  Pt off ASA due to prior GI bleeding acc to family.  To continue on statin.  Will sign off and reconsult as necessary.

## 2017-11-12 NOTE — PHYSICAL THERAPY INITIAL EVALUATION ADULT - ASSISTIVE DEVICE FOR TRANSFER: GAIT, REHAB EVAL
straight cane/held in R hand (pt ambulated 10ft with cane in L hand but felt more comfortable to cane in R hand)

## 2017-11-12 NOTE — CONSULT NOTE ADULT - ASSESSMENT
84 yo female admitted with chest pain.  Noted to have anemia.  No signs of active GIB.    1) will check guaiac  2) check iron studies, ferritin  3) PPI  4) No plans for invasive GI w/u at this time.
82yo [place] woman PMH CVA with no residual deficits, HTN, hypothyroidism, arthritis with R knee pain admitted for chest pain s/p cardiac cath yesterday; Neuro consulted for episode of AMS last night. Episode occurred in setting of loss of urine + distended bladder + orthostasis. Suspect pt likely had a near-syncope episode, possibly micturition syncope vs vasovagal syncope. Pt currently with no focal findings on neuro exam and at baseline per family at bedside.     - can check CT head  - continue with supportive care as per primary team

## 2017-11-13 ENCOUNTER — TRANSCRIPTION ENCOUNTER (OUTPATIENT)
Age: 82
End: 2017-11-13

## 2017-11-13 VITALS
DIASTOLIC BLOOD PRESSURE: 71 MMHG | HEART RATE: 63 BPM | OXYGEN SATURATION: 99 % | SYSTOLIC BLOOD PRESSURE: 143 MMHG | RESPIRATION RATE: 17 BRPM | TEMPERATURE: 99 F

## 2017-11-13 LAB
FERRITIN SERPL-MCNC: 139 NG/ML — SIGNIFICANT CHANGE UP (ref 15–150)
HCT VFR BLD CALC: 32.6 % — LOW (ref 34.5–45)
HGB BLD-MCNC: 11.1 G/DL — LOW (ref 11.5–15.5)
IRON SATN MFR SERPL: 28 % — SIGNIFICANT CHANGE UP (ref 14–50)
IRON SATN MFR SERPL: 67 UG/DL — SIGNIFICANT CHANGE UP (ref 30–160)
MCHC RBC-ENTMCNC: 32.4 PG — SIGNIFICANT CHANGE UP (ref 27–34)
MCHC RBC-ENTMCNC: 34 GM/DL — SIGNIFICANT CHANGE UP (ref 32–36)
MCV RBC AUTO: 95.2 FL — SIGNIFICANT CHANGE UP (ref 80–100)
PLATELET # BLD AUTO: 264 K/UL — SIGNIFICANT CHANGE UP (ref 150–400)
RBC # BLD: 3.42 M/UL — LOW (ref 3.8–5.2)
RBC # FLD: 11.9 % — SIGNIFICANT CHANGE UP (ref 10.3–14.5)
TIBC SERPL-MCNC: 240 UG/DL — SIGNIFICANT CHANGE UP (ref 220–430)
UIBC SERPL-MCNC: 173 UG/DL — SIGNIFICANT CHANGE UP (ref 110–370)
WBC # BLD: 8.5 K/UL — SIGNIFICANT CHANGE UP (ref 3.8–10.5)
WBC # FLD AUTO: 8.5 K/UL — SIGNIFICANT CHANGE UP (ref 3.8–10.5)

## 2017-11-13 RX ORDER — LOSARTAN POTASSIUM 100 MG/1
1 TABLET, FILM COATED ORAL
Qty: 30 | Refills: 0 | OUTPATIENT
Start: 2017-11-13 | End: 2017-12-13

## 2017-11-13 RX ORDER — ASPIRIN/CALCIUM CARB/MAGNESIUM 324 MG
1 TABLET ORAL
Qty: 30 | Refills: 0 | OUTPATIENT
Start: 2017-11-13 | End: 2017-12-13

## 2017-11-13 RX ORDER — CLOPIDOGREL BISULFATE 75 MG/1
1 TABLET, FILM COATED ORAL
Qty: 30 | Refills: 0 | OUTPATIENT
Start: 2017-11-13 | End: 2017-12-13

## 2017-11-13 RX ORDER — AMLODIPINE BESYLATE 2.5 MG/1
1 TABLET ORAL
Qty: 30 | Refills: 0 | OUTPATIENT
Start: 2017-11-13 | End: 2017-12-13

## 2017-11-13 RX ORDER — PANTOPRAZOLE SODIUM 20 MG/1
1 TABLET, DELAYED RELEASE ORAL
Qty: 30 | Refills: 0 | OUTPATIENT
Start: 2017-11-13 | End: 2017-12-13

## 2017-11-13 RX ADMIN — Medication 25 MICROGRAM(S): at 05:54

## 2017-11-13 RX ADMIN — PANTOPRAZOLE SODIUM 40 MILLIGRAM(S): 20 TABLET, DELAYED RELEASE ORAL at 05:54

## 2017-11-13 RX ADMIN — AMLODIPINE BESYLATE 5 MILLIGRAM(S): 2.5 TABLET ORAL at 05:54

## 2017-11-13 RX ADMIN — Medication 81 MILLIGRAM(S): at 11:24

## 2017-11-13 RX ADMIN — BUDESONIDE AND FORMOTEROL FUMARATE DIHYDRATE 2 PUFF(S): 160; 4.5 AEROSOL RESPIRATORY (INHALATION) at 05:54

## 2017-11-13 RX ADMIN — HEPARIN SODIUM 5000 UNIT(S): 5000 INJECTION INTRAVENOUS; SUBCUTANEOUS at 05:54

## 2017-11-13 RX ADMIN — CLOPIDOGREL BISULFATE 75 MILLIGRAM(S): 75 TABLET, FILM COATED ORAL at 11:24

## 2017-11-13 RX ADMIN — LOSARTAN POTASSIUM 50 MILLIGRAM(S): 100 TABLET, FILM COATED ORAL at 05:54

## 2017-11-13 NOTE — DISCHARGE NOTE ADULT - CARE PROVIDER_API CALL
Gelacio Roy (), Cardiology Medicine  [address]  [address]  [place] [address]  Phone: [phone]  Fax: [phone]    Abe Romeo), Gastroenterology; Internal Medicine  [address]  [place], [place] [id number]  Phone: [phone]  Fax: [phone] Gelacio Roy (), Cardiology Medicine  [address]  [address]  [place] [address]  Phone: [phone]  Fax: [phone]    Abe Romeo), Gastroenterology; Internal Medicine  [address]  [address]  [place], [place] [id number]  Phone: [phone]  Fax: [phone]    YEISON clemente  Phone: (   )    -  Fax: (   )    -

## 2017-11-13 NOTE — PROGRESS NOTE ADULT - SUBJECTIVE AND OBJECTIVE BOX
- Patient seen and examined.  - In summary, patient is a 83y year old woman who presented with Chest pain (06 Nov 2017 13:59)  - Today, patient is without complaints.         *****MEDICATIONS:    MEDICATIONS  (STANDING):  amLODIPine   Tablet 5 milliGRAM(s) Oral daily  aspirin enteric coated 81 milliGRAM(s) Oral daily  buDESOnide 160 MICROgram(s)/formoterol 4.5 MICROgram(s) Inhaler 2 Puff(s) Inhalation two times a day  clopidogrel Tablet 75 milliGRAM(s) Oral daily  heparin  Injectable 5000 Unit(s) SubCutaneous every 12 hours  levothyroxine 25 MICROGram(s) Oral daily  losartan 50 milliGRAM(s) Oral daily  pantoprazole    Tablet 40 milliGRAM(s) Oral before breakfast  simvastatin 10 milliGRAM(s) Oral at bedtime    MEDICATIONS  (PRN):  acetaminophen   Tablet. 650 milliGRAM(s) Oral every 6 hours PRN Mild Pain (1 - 3)  ALBUTerol    90 MICROgram(s) HFA Inhaler 2 Puff(s) Inhalation every 6 hours PRN Shortness of Breath and/or Wheezing               ***** REVIEW OF SYSTEM:  GEN: no fever, no chills, no pain  RESP: no SOB, no cough, no sputum  CVS: no chest pain, no palpitations, no edema  GI: no abdominal pain, no nausea, no vomiting, no constipation, no diarrhea  : no dysurea, no frequency  NEURO: no headache, no diziness  PSYCH: no depression, not anxious  Derm : no itching, no rash         ***** VITAL SIGNS:    T(F): 98.5 (11-12-17 @ 04:27), Max: 98.5 (11-12-17 @ 04:27)  HR: 57 (11-12-17 @ 04:27) (57 - 59)  BP: 113/63 (11-12-17 @ 04:27) (113/63 - 147/67)  RR: 18 (11-12-17 @ 04:27) (18 - 18)  SpO2: 95% (11-12-17 @ 04:27) (95% - 99%)  Wt(kg): --  ,   I&O's Summary    11 Nov 2017 07:01  -  12 Nov 2017 07:00  --------------------------------------------------------  IN: 1080 mL / OUT: 2000 mL / NET: -920 mL             *****PHYSICAL EXAM:  GEN: A&O X 3 , NAD , comfortable  HEENT: NCAT, EOMI, MMM, no icterus  NECK: Supple, No JVD  CVS: S1S2 , regular , No M/R/G appreciated  PULM: CTA B/L,  no W/R/R appreciated  ABD.: soft. non tender, non distended,  bowel sounds present  Extrem: intact pulses , no edema noted  Derm: No rash or ecchymosis noted  PSYCH: normal mood, no depression, not anxious         *****LAB AND IMAGING:                                           10.5   6.46  )-----------( 236      ( 12 Nov 2017 08:48 )             30.8               11-11    141  |  104  |  18  ----------------------------<  106<H>  4.4   |  22  |  1.05    Ca    8.8      11 Nov 2017 08:33        [All pertinent recent Imaging/Reports reviewed]         *****A S S E S S M E N T   A N D   P L A N :    83F with pleuritic CP after 1-2 weeks of ?viral URI.  cont tele  echo noted  nuc stress test with TID, cath negative  NSAIDS prn  nebs prn  cont IVF  monitor BP, orthostatic  head CT noted  f/u neuro  DCP    __________________________  MICHEL Patel D.O.
- Patient seen and examined.  - In summary, patient is a 83y year old woman who presented with Chest pain (06 Nov 2017 13:59)  - Today, patient is without complaints.         *****MEDICATIONS:    MEDICATIONS  (STANDING):  aspirin enteric coated 81 milliGRAM(s) Oral daily  buDESOnide 160 MICROgram(s)/formoterol 4.5 MICROgram(s) Inhaler 2 Puff(s) Inhalation two times a day  clopidogrel Tablet 75 milliGRAM(s) Oral daily  heparin  Injectable 5000 Unit(s) SubCutaneous every 12 hours  levothyroxine 25 MICROGram(s) Oral daily  pantoprazole    Tablet 40 milliGRAM(s) Oral before breakfast  simvastatin 10 milliGRAM(s) Oral at bedtime  sodium chloride 0.9%. 1000 milliLiter(s) (75 mL/Hr) IV Continuous <Continuous>    MEDICATIONS  (PRN):  acetaminophen   Tablet. 650 milliGRAM(s) Oral every 6 hours PRN Mild Pain (1 - 3)  ALBUTerol    90 MICROgram(s) HFA Inhaler 2 Puff(s) Inhalation every 6 hours PRN Shortness of Breath and/or Wheezing             ***** REVIEW OF SYSTEM:  GEN: no fever, no chills, no pain  RESP: no SOB, no cough, no sputum  CVS: no chest pain, no palpitations, no edema  GI: no abdominal pain, no nausea, no vomiting, no constipation, no diarrhea  : no dysurea, no frequency  NEURO: no headache, no diziness  PSYCH: no depression, not anxious  Derm : no itching, no rash         ***** VITAL SIGNS:    T(F): 98.1 (11-10-17 @ 08:41), Max: 98.4 (11-09-17 @ 20:39)  HR: 62 (11-10-17 @ 08:41) (53 - 66)  BP: 123/68 (11-10-17 @ 08:41) (107/70 - 142/85)  RR: 16 (11-10-17 @ 08:41) (16 - 209)  SpO2: 96% (11-10-17 @ 08:41) (96% - 100%)  Wt(kg): --  ,   I&O's Summary    09 Nov 2017 07:01  -  10 Nov 2017 07:00  --------------------------------------------------------  IN: 1400 mL / OUT: 1525 mL / NET: -125 mL    10 Nov 2017 07:01  -  10 Nov 2017 09:40  --------------------------------------------------------  IN: 240 mL / OUT: 0 mL / NET: 240 mL                 *****PHYSICAL EXAM:  GEN: A&O X 3 , NAD , comfortable  HEENT: NCAT, EOMI, MMM, no icterus  NECK: Supple, No JVD  CVS: S1S2 , regular , No M/R/G appreciated  PULM: CTA B/L,  no W/R/R appreciated  ABD.: soft. non tender, non distended,  bowel sounds present  Extrem: intact pulses , no edema noted  Derm: No rash or ecchymosis noted  PSYCH: normal mood, no depression, not anxious         *****LAB AND IMAGING:                                 10.3   9.20  )-----------( 245      ( 10 Nov 2017 07:25 )             30.6               11-10    141  |  102  |  25<H>  ----------------------------<  114<H>  4.3   |  25  |  1.34<H>    Ca    9.1      10 Nov 2017 07:29  Phos  5.3     11-10  Mg     2.1     11-10    TPro  7.5  /  Alb  4.5  /  TBili  0.2  /  DBili  x   /  AST  20  /  ALT  15  /  AlkPhos  61  11-10    PT/INR - ( 10 Nov 2017 03:52 )   PT: 10.5 sec;   INR: 0.97 ratio                CARDIAC MARKERS ( 10 Nov 2017 03:52 )  x     / <0.01 ng/mL / 54 U/L / x     / 1.9 ng/mL      [All pertinent recent Imaging/Reports reviewed]         *****A S S E S S M E N T   A N D   P L A N :    83F with pleuritic CP after 1-2 weeks of ?viral URI.  overnight events noted, now at baseline  cont tele  echo noted  nuc stress test with TID, cath negative  NSAIDS prn  nebs prn  cont IVF  monitor BP, orthostatic  await head CT      __________________________  MICHEL Patel D.O.
no complaints    MEDICATIONS  (STANDING):  amLODIPine   Tablet 5 milliGRAM(s) Oral daily  aspirin enteric coated 81 milliGRAM(s) Oral daily  buDESOnide 160 MICROgram(s)/formoterol 4.5 MICROgram(s) Inhaler 2 Puff(s) Inhalation two times a day  clopidogrel Tablet 75 milliGRAM(s) Oral daily  heparin  Injectable 5000 Unit(s) SubCutaneous every 12 hours  levothyroxine 25 MICROGram(s) Oral daily  losartan 50 milliGRAM(s) Oral daily  pantoprazole    Tablet 40 milliGRAM(s) Oral before breakfast  simvastatin 10 milliGRAM(s) Oral at bedtime    MEDICATIONS  (PRN):  acetaminophen   Tablet. 650 milliGRAM(s) Oral every 6 hours PRN Mild Pain (1 - 3)  ALBUTerol    90 MICROgram(s) HFA Inhaler 2 Puff(s) Inhalation every 6 hours PRN Shortness of Breath and/or Wheezing      Vital Signs Last 24 Hrs  T(C): 37.1 (13 Nov 2017 05:52), Max: 37.1 (13 Nov 2017 05:52)  T(F): 98.7 (13 Nov 2017 05:52), Max: 98.7 (13 Nov 2017 05:52)  HR: 58 (13 Nov 2017 05:52) (58 - 64)  BP: 147/76 (13 Nov 2017 05:52) (128/66 - 147/76)  BP(mean): --  RR: 18 (13 Nov 2017 05:52) (18 - 18)  SpO2: 97% (13 Nov 2017 05:52) (97% - 98%)  CAPILLARY BLOOD GLUCOSE        I&O's Summary    12 Nov 2017 07:01  -  13 Nov 2017 07:00  --------------------------------------------------------  IN: 1200 mL / OUT: 600 mL / NET: 600 mL        PHYSICAL EXAM:  HEAD:  Atraumatic, Normocephalic  NECK: Supple, No JVD  CHEST/LUNG: Clear to auscultation bilaterally; No wheeze  HEART: Regular rate and rhythm; No murmurs, rubs, or gallops  ABDOMEN: Soft, Nontender, ; Bowel sounds   EXTREMITIES:    no  edema  NEUROLOGY:  alert    LABS:                        10.5   6.46  )-----------( 236      ( 12 Nov 2017 08:48 )             30.8     11-12    141  |  105  |  15  ----------------------------<  114<H>  4.3   |  21<L>  |  1.01    Ca    9.7      12 Nov 2017 08:55                      Thyroid Stimulating Hormone, Serum: 0.68 uIU/mL (11-07 @ 07:33)      Consultant(s) Notes Reviewed:      Care Discussed with Consultants/Other Providers:
- Patient seen and examined.  - In summary, patient is a 83y year old woman who presented with Chest pain (06 Nov 2017 13:59)  - Today, patient is without complaints.         *****MEDICATIONS:    MEDICATIONS  (STANDING):  amLODIPine   Tablet 5 milliGRAM(s) Oral daily  aspirin enteric coated 81 milliGRAM(s) Oral daily  buDESOnide 160 MICROgram(s)/formoterol 4.5 MICROgram(s) Inhaler 2 Puff(s) Inhalation two times a day  clopidogrel Tablet 75 milliGRAM(s) Oral daily  heparin  Injectable 5000 Unit(s) SubCutaneous every 12 hours  levothyroxine 25 MICROGram(s) Oral daily  losartan 50 milliGRAM(s) Oral daily  pantoprazole    Tablet 40 milliGRAM(s) Oral before breakfast  simvastatin 10 milliGRAM(s) Oral at bedtime    MEDICATIONS  (PRN):  acetaminophen   Tablet. 650 milliGRAM(s) Oral every 6 hours PRN Mild Pain (1 - 3)  ALBUTerol    90 MICROgram(s) HFA Inhaler 2 Puff(s) Inhalation every 6 hours PRN Shortness of Breath and/or Wheezing               ***** REVIEW OF SYSTEM:  GEN: no fever, no chills, no pain  RESP: no SOB, no cough, no sputum  CVS: no chest pain, no palpitations, no edema  GI: no abdominal pain, no nausea, no vomiting, no constipation, no diarrhea  : no dysurea, no frequency  NEURO: no headache, no diziness  PSYCH: no depression, not anxious  Derm : no itching, no rash         ***** VITAL SIGNS:    T(F): 98.4 (11-11-17 @ 05:05), Max: 98.5 (11-10-17 @ 13:24)  HR: 73 (11-11-17 @ 05:05) (62 - 73)  BP: 154/66 (11-11-17 @ 05:05) (150/66 - 154/66)  RR: 18 (11-11-17 @ 05:05) (17 - 18)  SpO2: 97% (11-11-17 @ 05:05) (95% - 97%)  Wt(kg): --  ,   I&O's Summary    10 Nov 2017 07:01  -  11 Nov 2017 07:00  --------------------------------------------------------  IN: 2754 mL / OUT: 2000 mL / NET: 754 mL    11 Nov 2017 07:01  -  11 Nov 2017 09:08  --------------------------------------------------------  IN: 300 mL / OUT: 0 mL / NET: 300 mL                     *****PHYSICAL EXAM:  GEN: A&O X 3 , NAD , comfortable  HEENT: NCAT, EOMI, MMM, no icterus  NECK: Supple, No JVD  CVS: S1S2 , regular , No M/R/G appreciated  PULM: CTA B/L,  no W/R/R appreciated  ABD.: soft. non tender, non distended,  bowel sounds present  Extrem: intact pulses , no edema noted  Derm: No rash or ecchymosis noted  PSYCH: normal mood, no depression, not anxious         *****LAB AND IMAGING:                                      10.3   9.20  )-----------( 245      ( 10 Nov 2017 07:25 )             30.6               11-10    141  |  102  |  25<H>  ----------------------------<  114<H>  4.3   |  25  |  1.34<H>    Ca    9.1      10 Nov 2017 07:29  Phos  5.3     11-10  Mg     2.1     11-10    TPro  7.5  /  Alb  4.5  /  TBili  0.2  /  DBili  x   /  AST  20  /  ALT  15  /  AlkPhos  61  11-10    PT/INR - ( 10 Nov 2017 03:52 )   PT: 10.5 sec;   INR: 0.97 ratio                CARDIAC MARKERS ( 10 Nov 2017 03:52 )  x     / <0.01 ng/mL / 54 U/L / x     / 1.9 ng/mL                    [All pertinent recent Imaging/Reports reviewed]         *****A S S E S S M E N T   A N D   P L A N :    83F with pleuritic CP after 1-2 weeks of ?viral URI.  mental status at baseline  cont tele  echo noted  nuc stress test with TID, cath negative  NSAIDS prn  nebs prn  cont IVF  monitor BP, orthostatic  head CT noted  f/u neuro  DCP    __________________________  A. HANNAH Patel.
- Patient seen and examined.  - In summary, patient is a 83y year old woman who presented with Chest pain (2017 13:59)  - Today, patient is without complaints.         *****MEDICATIONS:    MEDICATIONS  (STANDING):  amLODIPine   Tablet 5 milliGRAM(s) Oral daily  aspirin enteric coated 81 milliGRAM(s) Oral daily  buDESOnide 160 MICROgram(s)/formoterol 4.5 MICROgram(s) Inhaler 2 Puff(s) Inhalation two times a day  clopidogrel Tablet 75 milliGRAM(s) Oral daily  heparin  Injectable 5000 Unit(s) SubCutaneous every 12 hours  levothyroxine 25 MICROGram(s) Oral daily  losartan 50 milliGRAM(s) Oral daily  pantoprazole    Tablet 40 milliGRAM(s) Oral before breakfast  simvastatin 10 milliGRAM(s) Oral at bedtime    MEDICATIONS  (PRN):  acetaminophen   Tablet. 650 milliGRAM(s) Oral every 6 hours PRN Mild Pain (1 - 3)  ALBUTerol    90 MICROgram(s) HFA Inhaler 2 Puff(s) Inhalation every 6 hours PRN Shortness of Breath and/or Wheezing           ***** REVIEW OF SYSTEM:  GEN: no fever, no chills, no pain  RESP: no SOB, no cough, no sputum  CVS: no chest pain, no palpitations, no edema  GI: no abdominal pain, no nausea, no vomiting, no constipation, no diarrhea  : no dysurea, no frequency  NEURO: no headache, no diziness  PSYCH: no depression, not anxious  Derm : no itching, no rash         ***** VITAL SIGNS:    T(F): 98.3 (17 @ 09:20), Max: 98.7 (17 @ 05:52)  HR: 62 (17 @ 09:20) (58 - 64)  BP: 124/65 (17 @ 09:20) (124/65 - 147/76)  RR: 18 (17 @ 09:20) (18 - 18)  SpO2: 97% (17 @ 09:20) (97% - 98%)  Wt(kg): --  ,   I&O's Summary    2017 07:01  -  2017 07:00  --------------------------------------------------------  IN: 1200 mL / OUT: 600 mL / NET: 600 mL                 *****PHYSICAL EXAM:  GEN: A&O X 3 , NAD , comfortable  HEENT: NCAT, EOMI, MMM, no icterus  NECK: Supple, No JVD  CVS: S1S2 , regular , No M/R/G appreciated  PULM: CTA B/L,  no W/R/R appreciated  ABD.: soft. non tender, non distended,  bowel sounds present  Extrem: intact pulses , no edema noted  Derm: No rash or ecchymosis noted  PSYCH: normal mood, no depression, not anxious         *****LAB AND IMAGIN.1   8.5   )-----------( 264      ( 2017 08:15 )             32.6               11-12    141  |  105  |  15  ----------------------------<  114<H>  4.3   |  21<L>  |  1.01    Ca    9.7      2017 08:55        [All pertinent recent Imaging/Reports reviewed]         *****A S S E S S M E N T   A N D   P L A N :    83F with pleuritic CP after 1-2 weeks of ?viral URI.  cont tele  echo noted  nuc stress test with TID, cath negative  NSAIDS prn  nebs prn  cont IVF  monitor BP, orthostatic  head CT noted  f/u neuro  DCP  can DC  plavix, cont asa 81  __________________________  MICHEL Patel D.O.
- Patient seen and examined.  - In summary, patient is a 83y year old woman who presented with Chest pain (2017 13:59)  - Today, patient is without complaints.         *****MEDICATIONS:    MEDICATIONS  (STANDING):  buDESOnide 160 MICROgram(s)/formoterol 4.5 MICROgram(s) Inhaler 2 Puff(s) Inhalation two times a day  heparin  Injectable 5000 Unit(s) SubCutaneous every 12 hours  hydrochlorothiazide 25 milliGRAM(s) Oral daily  levothyroxine 25 MICROGram(s) Oral daily  losartan 100 milliGRAM(s) Oral daily  pantoprazole    Tablet 40 milliGRAM(s) Oral before breakfast  simvastatin 10 milliGRAM(s) Oral at bedtime    MEDICATIONS  (PRN):  ALBUTerol    90 MICROgram(s) HFA Inhaler 2 Puff(s) Inhalation every 6 hours PRN Shortness of Breath and/or Wheezing           ***** REVIEW OF SYSTEM:  GEN: no fever, no chills, no pain  RESP: no SOB, no cough, no sputum  CVS: no chest pain, no palpitations, no edema  GI: no abdominal pain, no nausea, no vomiting, no constipation, no diarrhea  : no dysurea, no frequency  NEURO: no headache, no diziness  PSYCH: no depression, not anxious  Derm : no itching, no rash         ***** VITAL SIGNS:  T(F): 98.4 (17 @ 04:21), Max: 98.6 (17 @ 21:11)  HR: 81 (17 @ 04:21) (53 - 94)  BP: 131/76 (17 @ 04:21) (131/76 - 185/78)  RR: 18 (17 @ 04:21) (17 - 19)  SpO2: 96% (17 @ 04:21) (95% - 100%)  Wt(kg): --  ,   I&O's Summary    2017 07:01  -  2017 07:00  --------------------------------------------------------  IN: 300 mL / OUT: 650 mL / NET: -350 mL    2017 07:01  -  2017 08:56  --------------------------------------------------------  IN: 240 mL / OUT: 0 mL / NET: 240 mL             *****PHYSICAL EXAM:  GEN: A&O X 3 , NAD , comfortable  HEENT: NCAT, EOMI, MMM, no icterus  NECK: Supple, No JVD  CVS: S1S2 , regular , No M/R/G appreciated  PULM: CTA B/L,  no W/R/R appreciated  ABD.: soft. non tender, non distended,  bowel sounds present  Extrem: intact pulses , no edema noted  Derm: No rash or ecchymosis noted  PSYCH: normal mood, no depression, not anxious         *****LAB AND IMAGIN.2   7.1   )-----------( 253      ( 2017 10:06 )             35.3               11-06    141  |  103  |  14  ----------------------------<  91  4.3   |  24  |  1.04    Ca    10.1      2017 10:06    TPro  8.5<H>  /  Alb  4.9  /  TBili  0.4  /  DBili  x   /  AST  25  /  ALT  17  /  AlkPhos  72  11-06    PT/INR - ( 2017 10:46 )   PT: 10.7 sec;   INR: 0.98 ratio         PTT - ( 2017 10:46 )  PTT:22.8 sec       CARDIAC MARKERS ( 2017 00:47 )  x     / <0.01 ng/mL / 83 U/L / x     / 2.4 ng/mL  CARDIAC MARKERS ( 2017 17:02 )  x     / <0.01 ng/mL / 91 U/L / x     / 1.8 ng/mL  CARDIAC MARKERS ( 2017 10:06 )  x     / <0.01 ng/mL / 98 U/L / x     / 2.1 ng/mL              Urinalysis Basic - ( 2017 11:05 )    Color: PL Yellow / Appearance: Clear / S.007 / pH: x  Gluc: x / Ketone: Negative  / Bili: Negative / Urobili: Negative   Blood: x / Protein: Negative / Nitrite: Negative   Leuk Esterase: Negative / RBC: x / WBC x   Sq Epi: x / Non Sq Epi: OCC /HPF / Bacteria: Few /HPF      [All pertinent recent Imaging/Reports reviewed]         *****A S S E S S M E N T   A N D   P L A N :    83F with pleuritic CP after 1-2 weeks of ?viral URI.  No S+S of CHF (low bnp, clear cxr)  3 sets CE negative  cont tele  check RVP  echo  low suspicion for acs but given risk factors would get nuc stress test  per daughter pt is active so less likely PE  check LE doppler  likely musculoskeletal pain  NSAIDS prn  nebs prn  consider pulm eval        __________________________  A. HANNAH Patel.
CARDIOLOGY     PROGRESS  NOTE   ________________________________________________    CHIEF COMPLAINT:Patient is a 83y old  Female who presents with a chief complaint of Chest pain (06 Nov 2017 13:59)  awaiting cath  	  REVIEW OF SYSTEMS:  CONSTITUTIONAL: No fever, weight loss, or fatigue  EYES: No eye pain, visual disturbances, or discharge  ENT:  No difficulty hearing, tinnitus, vertigo; No sinus or throat pain  NECK: No pain or stiffness  RESPIRATORY: No cough, wheezing, chills or hemoptysis; No Shortness of Breath  CARDIOVASCULAR: No chest pain, palpitations, passing out, dizziness, or leg swelling  GASTROINTESTINAL: No abdominal or epigastric pain. No nausea, vomiting, or hematemesis; No diarrhea or constipation. No melena or hematochezia.  GENITOURINARY: No dysuria, frequency, hematuria, or incontinence  NEUROLOGICAL: No headaches, memory loss, loss of strength, numbness, or tremors  SKIN: No itching, burning, rashes, or lesions   LYMPH Nodes: No enlarged glands  ENDOCRINE: No heat or cold intolerance; No hair loss  MUSCULOSKELETAL: No joint pain or swelling; No muscle, back, or extremity pain  PSYCHIATRIC: No depression, anxiety, mood swings, or difficulty sleeping  HEME/LYMPH: No easy bruising, or bleeding gums  ALLERGY AND IMMUNOLOGIC: No hives or eczema	    [ ] All others negative	  [ ] Unable to obtain    PHYSICAL EXAM:  T(C): 36.4 (11-09-17 @ 06:23), Max: 36.7 (11-08-17 @ 12:20)  HR: 67 (11-09-17 @ 06:23) (47 - 67)  BP: 120/54 (11-09-17 @ 06:23) (120/54 - 161/65)  RR: 19 (11-08-17 @ 20:32) (19 - 19)  SpO2: 97% (11-08-17 @ 20:32) (97% - 98%)  Wt(kg): --  I&O's Summary    08 Nov 2017 07:01  -  09 Nov 2017 07:00  --------------------------------------------------------  IN: 1380 mL / OUT: 1400 mL / NET: -20 mL    09 Nov 2017 07:01  -  09 Nov 2017 09:35  --------------------------------------------------------  IN: 0 mL / OUT: 400 mL / NET: -400 mL        Appearance: Normal	  HEENT:   Normal oral mucosa, PERRL, EOMI	  Lymphatic: No lymphadenopathy  Cardiovascular: Normal S1 S2, No JVD, No murmurs, No edema  Respiratory: Lungs clear to auscultation	  Psychiatry: A & O x 3, Mood & affect appropriate  Gastrointestinal:  Soft, Non-tender, + BS	  Skin: No rashes, No ecchymoses, No cyanosis	  Neurologic: Non-focal  Extremities: Normal range of motion, No clubbing, cyanosis or edema  Vascular: Peripheral pulses palpable 2+ bilaterally    MEDICATIONS  (STANDING):  aspirin enteric coated 81 milliGRAM(s) Oral daily  buDESOnide 160 MICROgram(s)/formoterol 4.5 MICROgram(s) Inhaler 2 Puff(s) Inhalation two times a day  clopidogrel Tablet 75 milliGRAM(s) Oral daily  heparin  Injectable 5000 Unit(s) SubCutaneous every 12 hours  hydrochlorothiazide 25 milliGRAM(s) Oral daily  levothyroxine 25 MICROGram(s) Oral daily  losartan 100 milliGRAM(s) Oral daily  pantoprazole    Tablet 40 milliGRAM(s) Oral before breakfast  simvastatin 10 milliGRAM(s) Oral at bedtime      TELEMETRY: 	    ECG:  	  RADIOLOGY:  OTHER: 	  	  LABS:	 	    CARDIAC MARKERS:  CARDIAC MARKERS ( 07 Nov 2017 15:17 )  x     / x     / 88 U/L / x     / x                                    11.0   10.95 )-----------( 272      ( 08 Nov 2017 07:28 )             32.6     11-08    142  |  104  |  31<H>  ----------------------------<  100<H>  4.3   |  24  |  1.24    Ca    9.8      08 Nov 2017 07:45      proBNP: Serum Pro-Brain Natriuretic Peptide: 78 pg/mL (11-06 @ 10:06)    Lipid Profile: Cholesterol 175    HDL 61  TG 69    HgA1c:   TSH: Thyroid Stimulating Hormone, Serum: 0.68 uIU/mL (11-07 @ 07:33)          Assessment and plan  ---------------------------  pt with chest pain ,pos stress test  cath today  continue asa  Plavix beta blocker
CARDIOLOGY     PROGRESS  NOTE   ________________________________________________    CHIEF COMPLAINT:Patient is a 83y old  Female who presents with a chief complaint of Chest pain (06 Nov 2017 13:59)  no further [place].  	  REVIEW OF SYSTEMS:  CONSTITUTIONAL: No fever, weight loss, or fatigue  EYES: No eye pain, visual disturbances, or discharge  ENT:  No difficulty hearing, tinnitus, vertigo; No sinus or throat pain  NECK: No pain or stiffness  RESPIRATORY: No cough, wheezing, chills or hemoptysis; No Shortness of Breath  CARDIOVASCULAR: No chest pain, palpitations, passing out, dizziness, or leg swelling  GASTROINTESTINAL: No abdominal or epigastric pain. No nausea, vomiting, or hematemesis; No diarrhea or constipation. No melena or hematochezia.  GENITOURINARY: No dysuria, frequency, hematuria, or incontinence  NEUROLOGICAL: No headaches, memory loss, loss of strength, numbness, or tremors  SKIN: No itching, burning, rashes, or lesions   LYMPH Nodes: No enlarged glands  ENDOCRINE: No heat or cold intolerance; No hair loss  MUSCULOSKELETAL: No joint pain or swelling; No muscle, back, or extremity pain  PSYCHIATRIC: No depression, anxiety, mood swings, or difficulty sleeping  HEME/LYMPH: No easy bruising, or bleeding gums  ALLERGY AND IMMUNOLOGIC: No hives or eczema	    [ ] All others negative	  [ ] Unable to obtain    PHYSICAL EXAM:  T(C): 36.7 (11-08-17 @ 04:18), Max: 36.8 (11-07-17 @ 14:04)  HR: 51 (11-08-17 @ 04:18) (50 - 60)  BP: 134/67 (11-08-17 @ 04:18) (126/61 - 134/67)  RR: 20 (11-08-17 @ 04:18) (17 - 20)  SpO2: 98% (11-08-17 @ 04:18) (97% - 98%)  Wt(kg): --  I&O's Summary    07 Nov 2017 07:01  -  08 Nov 2017 07:00  --------------------------------------------------------  IN: 1020 mL / OUT: 1500 mL / NET: -480 mL        Appearance: Normal	  HEENT:   Normal oral mucosa, PERRL, EOMI	  Lymphatic: No lymphadenopathy  Cardiovascular: Normal S1 S2, No JVD, + syrstolic murmurs, No edema  Respiratory: Lungs clear to auscultation	  Psychiatry: A & O x 3, Mood & affect appropriate  Gastrointestinal:  Soft, Non-tender, + BS	  Skin: No rashes, No ecchymoses, No cyanosis	  Neurologic: Non-focal  Extremities: Normal range of motion, No clubbing, cyanosis or edema  Vascular: Peripheral pulses palpable 2+ bilaterally    MEDICATIONS  (STANDING):  buDESOnide 160 MICROgram(s)/formoterol 4.5 MICROgram(s) Inhaler 2 Puff(s) Inhalation two times a day  heparin  Injectable 5000 Unit(s) SubCutaneous every 12 hours  hydrochlorothiazide 25 milliGRAM(s) Oral daily  levothyroxine 25 MICROGram(s) Oral daily  losartan 100 milliGRAM(s) Oral daily  pantoprazole    Tablet 40 milliGRAM(s) Oral before breakfast  simvastatin 10 milliGRAM(s) Oral at bedtime      TELEMETRY: nsr 45 to 60	    ECG:  	  RADIOLOGY:  OTHER: 	  	  LABS:	 	    CARDIAC MARKERS:  CARDIAC MARKERS ( 07 Nov 2017 15:17 )  x     / x     / 88 U/L / x     / x      CARDIAC MARKERS ( 07 Nov 2017 00:47 )  x     / <0.01 ng/mL / 83 U/L / x     / 2.4 ng/mL  CARDIAC MARKERS ( 06 Nov 2017 17:02 )  x     / <0.01 ng/mL / 91 U/L / x     / 1.8 ng/mL  CARDIAC MARKERS ( 06 Nov 2017 10:06 )  x     / <0.01 ng/mL / 98 U/L / x     / 2.1 ng/mL                                12.2   7.1   )-----------( 253      ( 06 Nov 2017 10:06 )             35.3     11-06    141  |  103  |  14  ----------------------------<  91  4.3   |  24  |  1.04    Ca    10.1      06 Nov 2017 10:06    TPro  8.5<H>  /  Alb  4.9  /  TBili  0.4  /  DBili  x   /  AST  25  /  ALT  17  /  AlkPhos  72  11-06    proBNP: Serum Pro-Brain Natriuretic Peptide: 78 pg/mL (11-06 @ 10:06)    Lipid Profile: Cholesterol 175    HDL 61  TG 69    HgA1c:   TSH: Thyroid Stimulating Hormone, Serum: 0.68 uIU/mL (11-07 @ 07:33)    PT/INR - ( 06 Nov 2017 10:46 )   PT: 10.7 sec;   INR: 0.98 ratio         PTT - ( 06 Nov 2017 10:46 )  PTT:22.8 sec  < from: Nuclear Stress Test-Pharmacologic (11.07.17 @ 13:40) >  * Chest Pain: No chest pain with administration of  Regadenoson.  * Symptom: Nausea, dizziness.  * HR Response: Appropriate.  * BP Response: Appropriate.  * Heart Rhythm: Sinus Rhythm - 60 BPM - 1st Degree Heart  Block.  * Baseline ECG: Nonspecific ST-T wave abnormality.  * ECG Changes: No significant ischemic ST segment changes  beyond baseline abnormalities.  * Arrhythmia: None.  * The left ventricle was markedly dilated at stress. There  is a small, mild to moderate defect in the apical lateral  wall that is mostly reversible suggestive of ischemia with  partial scarring.  * There is a small, mild defect in the basal inferior and  basal inferoseptal walls that is fixed suggestive of scar.  * The left ventricular cavity appears to dilate with  stress with a calculated TID ratio of 1.49 which is  abnormal for this protocol.Transient ischemic dilation of  the LV was noted.  * Post-stress gated wall motion analysis was performed  (LVEF > 70%;LVEDV = 68 ml.), revealing mild hypokinesis of  the basal inferior and basal inferoseptalwalls and reduced  systolic thickening of the apical lateral wall.  Overall  left ventricular ejection is preserved.    < end of copied text >      Assessment and plan  ---------------------------  chest pain/pos stress test  check echo   will adjust meds  no beta blocker due to bradycardia  cath in am  will adjust meds
Call received by Dr. Burk neurology regarding recommendation for non contrast MRI to rule out ischemia. MRI ordered. Will follow
HPI:  82 yo female with PMH HTN, hypothyroidism, asthma, prior CVA, to ER with constant crampy squeezing chest pain which radiates to the back associated with chest pain.  Symptoms have been going on since Friday but worsened this AM.  Received flu vaccine one week ago.  Still endorses chest pain with palpation of chest wall. No angina. States that it is in the epigastric region with radiation to the right shoulder.  Pain worse with movement, eating and taking a deep breath.  Last echo was in the summer and reported to family as normal. Does not remember her last stress test.   No prior cardiac stents or DM.     Denies HA, syncope, abdominal pain, hematuria, melena, fevers or leg edema.    Daughter phone number to assist with translation is [phone] (06 Nov 2017 13:59)      Interval History -        Subjective:    MEDICATIONS  (STANDING):  amLODIPine   Tablet 5 milliGRAM(s) Oral daily  aspirin enteric coated 81 milliGRAM(s) Oral daily  buDESOnide 160 MICROgram(s)/formoterol 4.5 MICROgram(s) Inhaler 2 Puff(s) Inhalation two times a day  clopidogrel Tablet 75 milliGRAM(s) Oral daily  heparin  Injectable 5000 Unit(s) SubCutaneous every 12 hours  levothyroxine 25 MICROGram(s) Oral daily  losartan 50 milliGRAM(s) Oral daily  pantoprazole    Tablet 40 milliGRAM(s) Oral before breakfast  simvastatin 10 milliGRAM(s) Oral at bedtime    MEDICATIONS  (PRN):  acetaminophen   Tablet. 650 milliGRAM(s) Oral every 6 hours PRN Mild Pain (1 - 3)  ALBUTerol    90 MICROgram(s) HFA Inhaler 2 Puff(s) Inhalation every 6 hours PRN Shortness of Breath and/or Wheezing      Allergies    penicillin (Unknown)    Intolerances        Objective:   Vital Signs Last 24 Hrs  T(C): 36.9 (12 Nov 2017 04:27), Max: 36.9 (12 Nov 2017 04:27)  T(F): 98.5 (12 Nov 2017 04:27), Max: 98.5 (12 Nov 2017 04:27)  HR: 57 (12 Nov 2017 04:27) (57 - 59)  BP: 113/63 (12 Nov 2017 04:27) (113/63 - 147/67)  BP(mean): --  RR: 18 (12 Nov 2017 04:27) (18 - 18)  SpO2: 95% (12 Nov 2017 04:27) (95% - 99%)    General Exam:   General appearance: No acute distress                   Neurological Exam:  Awake and alert this am, responding appropriately to examiner.  No overt CN or motor deficits.    Other:  CBC Full  -  ( 12 Nov 2017 08:48 )  WBC Count : 6.46 K/uL  Hemoglobin : 10.5 g/dL  Hematocrit : 30.8 %  Platelet Count - Automated : 236 K/uL  Mean Cell Volume : 92.2 fl  Mean Cell Hemoglobin : 31.4 pg  Mean Cell Hemoglobin Concentration : 34.1 gm/dL  Auto Neutrophil # : x  Auto Lymphocyte # : x  Auto Monocyte # : x  Auto Eosinophil # : x  Auto Basophil # : x  Auto Neutrophil % : x  Auto Lymphocyte % : x  Auto Monocyte % : x  Auto Eosinophil % : x  Auto Basophil % : x    11-12    141  |  105  |  15  ----------------------------<  114<H>  4.3   |  21<L>  |  1.01    Ca    9.7      12 Nov 2017 08:55      11-12    141  |  105  |  15  ----------------------------<  114<H>  4.3   |  21<L>  |  1.01    Ca    9.7      12 Nov 2017 08:55          Imaging:     CT  Head age indeterminate lacune genu int capsule R    MRI Head official reading pending.  I see no DWI positivity to correlate with any acute ischemia.
in bed,  no  cp/ sob  daughter at  bedside    MEDICATIONS  (STANDING):  buDESOnide 160 MICROgram(s)/formoterol 4.5 MICROgram(s) Inhaler 2 Puff(s) Inhalation two times a day  heparin  Injectable 5000 Unit(s) SubCutaneous every 12 hours  hydrochlorothiazide 25 milliGRAM(s) Oral daily  levothyroxine 25 MICROGram(s) Oral daily  losartan 100 milliGRAM(s) Oral daily  pantoprazole    Tablet 40 milliGRAM(s) Oral before breakfast  simvastatin 10 milliGRAM(s) Oral at bedtime    MEDICATIONS  (PRN):  ALBUTerol    90 MICROgram(s) HFA Inhaler 2 Puff(s) Inhalation every 6 hours PRN Shortness of Breath and/or Wheezing      Vital Signs Last 24 Hrs  T(C): 36.7 (2017 04:18), Max: 36.8 (2017 14:04)  T(F): 98.1 (2017 04:18), Max: 98.3 (2017 14:04)  HR: 51 (2017 04:18) (50 - 60)  BP: 134/67 (2017 04:18) (126/61 - 134/67)  BP(mean): --  RR: 20 (2017 04:18) (17 - 20)  SpO2: 98% (2017 04:18) (97% - 98%)  CAPILLARY BLOOD GLUCOSE        I&O's Summary    2017 07:01  -  2017 07:00  --------------------------------------------------------  IN: 1020 mL / OUT: 1500 mL / NET: -480 mL        PHYSICAL EXAM:  HEAD:  Atraumatic, Normocephalic  NECK: Supple, No JVD  CHEST/LUNG: Clear to auscultation bilaterally; No wheeze  HEART: Regular rate and rhythm; No murmurs, rubs, or gallops  ABDOMEN: Soft, Nontender, ; Bowel sounds   EXTREMITIES:    no  edema  NEUROLOGY:  alert    LABS:                        12.2   7.1   )-----------( 253      ( 2017 10:06 )             35.3     11-06    141  |  103  |  14  ----------------------------<  91  4.3   |  24  |  1.04    Ca    10.1      2017 10:06    TPro  8.5<H>  /  Alb  4.9  /  TBili  0.4  /  DBili  x   /  AST  25  /  ALT  17  /  AlkPhos  72  11-06    PT/INR - ( 2017 10:46 )   PT: 10.7 sec;   INR: 0.98 ratio         PTT - ( 2017 10:46 )  PTT:22.8 sec  CARDIAC MARKERS ( 2017 15:17 )  x     / x     / 88 U/L / x     / x      CARDIAC MARKERS ( 2017 00:47 )  x     / <0.01 ng/mL / 83 U/L / x     / 2.4 ng/mL  CARDIAC MARKERS ( 2017 17:02 )  x     / <0.01 ng/mL / 91 U/L / x     / 1.8 ng/mL  CARDIAC MARKERS ( 2017 10:06 )  x     / <0.01 ng/mL / 98 U/L / x     / 2.1 ng/mL      Urinalysis Basic - ( 2017 11:05 )    Color: PL Yellow / Appearance: Clear / S.007 / pH: x  Gluc: x / Ketone: Negative  / Bili: Negative / Urobili: Negative   Blood: x / Protein: Negative / Nitrite: Negative   Leuk Esterase: Negative / RBC: x / WBC x   Sq Epi: x / Non Sq Epi: OCC /HPF / Bacteria: Few /HPF              Thyroid Stimulating Hormone, Serum: 0.68 uIU/mL ( @ 07:33)      Consultant(s) Notes Reviewed:      Care Discussed with Consultants/Other Providers:
no  cp    MEDICATIONS  (STANDING):  aspirin enteric coated 81 milliGRAM(s) Oral daily  buDESOnide 160 MICROgram(s)/formoterol 4.5 MICROgram(s) Inhaler 2 Puff(s) Inhalation two times a day  clopidogrel Tablet 75 milliGRAM(s) Oral daily  heparin  Injectable 5000 Unit(s) SubCutaneous every 12 hours  hydrochlorothiazide 25 milliGRAM(s) Oral daily  levothyroxine 25 MICROGram(s) Oral daily  losartan 100 milliGRAM(s) Oral daily  pantoprazole    Tablet 40 milliGRAM(s) Oral before breakfast  simvastatin 10 milliGRAM(s) Oral at bedtime    MEDICATIONS  (PRN):  acetaminophen   Tablet. 650 milliGRAM(s) Oral every 6 hours PRN Mild Pain (1 - 3)  ALBUTerol    90 MICROgram(s) HFA Inhaler 2 Puff(s) Inhalation every 6 hours PRN Shortness of Breath and/or Wheezing      Vital Signs Last 24 Hrs  T(C): 36.4 (09 Nov 2017 06:23), Max: 36.7 (08 Nov 2017 12:20)  T(F): 97.6 (09 Nov 2017 06:23), Max: 98 (08 Nov 2017 12:20)  HR: 67 (09 Nov 2017 06:23) (47 - 67)  BP: 120/54 (09 Nov 2017 06:23) (120/54 - 161/65)  BP(mean): --  RR: 19 (08 Nov 2017 20:32) (19 - 19)  SpO2: 97% (08 Nov 2017 20:32) (97% - 98%)  CAPILLARY BLOOD GLUCOSE        I&O's Summary    08 Nov 2017 07:01  -  09 Nov 2017 07:00  --------------------------------------------------------  IN: 1380 mL / OUT: 1400 mL / NET: -20 mL    09 Nov 2017 07:01  -  09 Nov 2017 09:27  --------------------------------------------------------  IN: 0 mL / OUT: 400 mL / NET: -400 mL        PHYSICAL EXAM:  HEAD:  Atraumatic, Normocephalic  NECK: Supple, No JVD  CHEST/LUNG: Clear to auscultation bilaterally; No wheeze  HEART: Regular rate and rhythm; No murmurs, rubs, or gallops  ABDOMEN: Soft, Nontender, ; Bowel sounds   EXTREMITIES:    no  edema  NEUROLOGY:  alert    LABS:                        11.0   10.95 )-----------( 272      ( 08 Nov 2017 07:28 )             32.6     11-08    142  |  104  |  31<H>  ----------------------------<  100<H>  4.3   |  24  |  1.24    Ca    9.8      08 Nov 2017 07:45        CARDIAC MARKERS ( 07 Nov 2017 15:17 )  x     / x     / 88 U/L / x     / x                    Thyroid Stimulating Hormone, Serum: 0.68 uIU/mL (11-07 @ 07:33)      Consultant(s) Notes Reviewed:      Care Discussed with Consultants/Other Providers:
no melena    MEDICATIONS  (STANDING):  amLODIPine   Tablet 5 milliGRAM(s) Oral daily  aspirin enteric coated 81 milliGRAM(s) Oral daily  buDESOnide 160 MICROgram(s)/formoterol 4.5 MICROgram(s) Inhaler 2 Puff(s) Inhalation two times a day  clopidogrel Tablet 75 milliGRAM(s) Oral daily  heparin  Injectable 5000 Unit(s) SubCutaneous every 12 hours  levothyroxine 25 MICROGram(s) Oral daily  losartan 50 milliGRAM(s) Oral daily  pantoprazole    Tablet 40 milliGRAM(s) Oral before breakfast  simvastatin 10 milliGRAM(s) Oral at bedtime    MEDICATIONS  (PRN):  acetaminophen   Tablet. 650 milliGRAM(s) Oral every 6 hours PRN Mild Pain (1 - 3)  ALBUTerol    90 MICROgram(s) HFA Inhaler 2 Puff(s) Inhalation every 6 hours PRN Shortness of Breath and/or Wheezing      Vital Signs Last 24 Hrs  T(C): 36.9 (12 Nov 2017 04:27), Max: 36.9 (12 Nov 2017 04:27)  T(F): 98.5 (12 Nov 2017 04:27), Max: 98.5 (12 Nov 2017 04:27)  HR: 57 (12 Nov 2017 04:27) (57 - 59)  BP: 113/63 (12 Nov 2017 04:27) (113/63 - 147/67)  BP(mean): --  RR: 18 (12 Nov 2017 04:27) (18 - 18)  SpO2: 95% (12 Nov 2017 04:27) (95% - 99%)  CAPILLARY BLOOD GLUCOSE        I&O's Summary    10 Nov 2017 07:01  -  11 Nov 2017 07:00  --------------------------------------------------------  IN: 2754 mL / OUT: 2000 mL / NET: 754 mL    11 Nov 2017 07:01  -  12 Nov 2017 06:52  --------------------------------------------------------  IN: 1080 mL / OUT: 2000 mL / NET: -920 mL        PHYSICAL EXAM:  HEAD:  Atraumatic, Normocephalic  NECK: Supple, No JVD  CHEST/LUNG: Clear to auscultation bilaterally; No wheeze  HEART: Regular rate and rhythm; No murmurs, rubs, or gallops  ABDOMEN: Soft, Nontender, ; Bowel sounds   EXTREMITIES:    no  edema  NEUROLOGY:  alert    LABS:                        10.3   8.24  )-----------( 237      ( 11 Nov 2017 08:39 )             30.6     11-11    141  |  104  |  18  ----------------------------<  106<H>  4.4   |  22  |  1.05    Ca    8.8      11 Nov 2017 08:33                      Thyroid Stimulating Hormone, Serum: 0.68 uIU/mL (11-07 @ 07:33)      Consultant(s) Notes Reviewed:      Care Discussed with Consultants/Other Providers:
tele, nsr   no  cp  today   daughter at  bedside    MEDICATIONS  (STANDING):  buDESOnide 160 MICROgram(s)/formoterol 4.5 MICROgram(s) Inhaler 2 Puff(s) Inhalation two times a day  heparin  Injectable 5000 Unit(s) SubCutaneous every 12 hours  hydrochlorothiazide 25 milliGRAM(s) Oral daily  ibuprofen  Tablet 400 milliGRAM(s) Oral three times a day  levothyroxine 25 MICROGram(s) Oral daily  losartan 100 milliGRAM(s) Oral daily  pantoprazole    Tablet 40 milliGRAM(s) Oral before breakfast  simvastatin 10 milliGRAM(s) Oral at bedtime    MEDICATIONS  (PRN):  ALBUTerol    90 MICROgram(s) HFA Inhaler 2 Puff(s) Inhalation every 6 hours PRN Shortness of Breath and/or Wheezing      Vital Signs Last 24 Hrs  T(C): 36.9 (2017 04:21), Max: 37 (2017 21:11)  T(F): 98.4 (2017 04:21), Max: 98.6 (2017 21:11)  HR: 81 (2017 04:21) (53 - 94)  BP: 131/76 (2017 04:21) (131/76 - 185/78)  BP(mean): --  RR: 18 (2017 04:21) (17 - 19)  SpO2: 96% (2017 04:21) (95% - 100%)  CAPILLARY BLOOD GLUCOSE        I&O's Summary    2017 07:01  -  2017 07:00  --------------------------------------------------------  IN: 300 mL / OUT: 650 mL / NET: -350 mL        PHYSICAL EXAM:  HEAD:  Atraumatic, Normocephalic  NECK: Supple, No JVD  CHEST/LUNG: Clear to auscultation bilaterally; No wheeze  HEART: Regular rate and rhythm; No murmurs, rubs, or gallops  ABDOMEN: Soft, Nontender, ; Bowel sounds   EXTREMITIES:    no  edema  NEUROLOGY:  alert    LABS:                        12.2   7.1   )-----------( 253      ( 2017 10:06 )             35.3     11    141  |  103  |  14  ----------------------------<  91  4.3   |  24  |  1.04    Ca    10.1      2017 10:06    TPro  8.5<H>  /  Alb  4.9  /  TBili  0.4  /  DBili  x   /  AST  25  /  ALT  17  /  AlkPhos  72  11-06    PT/INR - ( 2017 10:46 )   PT: 10.7 sec;   INR: 0.98 ratio         PTT - ( 2017 10:46 )  PTT:22.8 sec  CARDIAC MARKERS ( 2017 00:47 )  x     / <0.01 ng/mL / 83 U/L / x     / 2.4 ng/mL  CARDIAC MARKERS ( 2017 17:02 )  x     / <0.01 ng/mL / 91 U/L / x     / 1.8 ng/mL  CARDIAC MARKERS ( 2017 10:06 )  x     / <0.01 ng/mL / 98 U/L / x     / 2.1 ng/mL      Urinalysis Basic - ( 2017 11:05 )    Color: PL Yellow / Appearance: Clear / S.007 / pH: x  Gluc: x / Ketone: Negative  / Bili: Negative / Urobili: Negative   Blood: x / Protein: Negative / Nitrite: Negative   Leuk Esterase: Negative / RBC: x / WBC x   Sq Epi: x / Non Sq Epi: OCC /HPF / Bacteria: Few /HPF                  Consultant(s) Notes Reviewed:      Care Discussed with Consultants/Other Providers:
tele, nsr  no complaints    MEDICATIONS  (STANDING):  aspirin enteric coated 81 milliGRAM(s) Oral daily  buDESOnide 160 MICROgram(s)/formoterol 4.5 MICROgram(s) Inhaler 2 Puff(s) Inhalation two times a day  clopidogrel Tablet 75 milliGRAM(s) Oral daily  heparin  Injectable 5000 Unit(s) SubCutaneous every 12 hours  levothyroxine 25 MICROGram(s) Oral daily  pantoprazole    Tablet 40 milliGRAM(s) Oral before breakfast  simvastatin 10 milliGRAM(s) Oral at bedtime  sodium chloride 0.9%. 1000 milliLiter(s) (75 mL/Hr) IV Continuous <Continuous>    MEDICATIONS  (PRN):  acetaminophen   Tablet. 650 milliGRAM(s) Oral every 6 hours PRN Mild Pain (1 - 3)  ALBUTerol    90 MICROgram(s) HFA Inhaler 2 Puff(s) Inhalation every 6 hours PRN Shortness of Breath and/or Wheezing      Vital Signs Last 24 Hrs  T(C): 36.9 (11 Nov 2017 05:05), Max: 36.9 (10 Nov 2017 13:24)  T(F): 98.4 (11 Nov 2017 05:05), Max: 98.5 (10 Nov 2017 13:24)  HR: 73 (11 Nov 2017 05:05) (62 - 73)  BP: 154/66 (11 Nov 2017 05:05) (123/68 - 154/66)  BP(mean): --  RR: 18 (11 Nov 2017 05:05) (16 - 18)  SpO2: 97% (11 Nov 2017 05:05) (95% - 97%)  CAPILLARY BLOOD GLUCOSE        I&O's Summary    10 Nov 2017 07:01  -  11 Nov 2017 07:00  --------------------------------------------------------  IN: 1854 mL / OUT: 2000 mL / NET: -146 mL        PHYSICAL EXAM:  HEAD:  Atraumatic, Normocephalic  NECK: Supple, No JVD  CHEST/LUNG: Clear to auscultation bilaterally; No wheeze  HEART: Regular rate and rhythm; No murmurs, rubs, or gallops  ABDOMEN: Soft, Nontender, ; Bowel sounds   EXTREMITIES:    no  edema  NEUROLOGY:  alert    LABS:                        10.3   9.20  )-----------( 245      ( 10 Nov 2017 07:25 )             30.6     11-10    141  |  102  |  25<H>  ----------------------------<  114<H>  4.3   |  25  |  1.34<H>    Ca    9.1      10 Nov 2017 07:29  Phos  5.3     11-10  Mg     2.1     11-10    TPro  7.5  /  Alb  4.5  /  TBili  0.2  /  DBili  x   /  AST  20  /  ALT  15  /  AlkPhos  61  11-10    PT/INR - ( 10 Nov 2017 03:52 )   PT: 10.5 sec;   INR: 0.97 ratio           CARDIAC MARKERS ( 10 Nov 2017 03:52 )  x     / <0.01 ng/mL / 54 U/L / x     / 1.9 ng/mL                Thyroid Stimulating Hormone, Serum: 0.68 uIU/mL (11-07 @ 07:33)      Consultant(s) Notes Reviewed:      Care Discussed with Consultants/Other Providers:
tele, nsr, lowest 44  alert,  eating    MEDICATIONS  (STANDING):  aspirin enteric coated 81 milliGRAM(s) Oral daily  buDESOnide 160 MICROgram(s)/formoterol 4.5 MICROgram(s) Inhaler 2 Puff(s) Inhalation two times a day  clopidogrel Tablet 75 milliGRAM(s) Oral daily  heparin  Injectable 5000 Unit(s) SubCutaneous every 12 hours  levothyroxine 25 MICROGram(s) Oral daily  pantoprazole    Tablet 40 milliGRAM(s) Oral before breakfast  simvastatin 10 milliGRAM(s) Oral at bedtime  sodium chloride 0.9%. 1000 milliLiter(s) (75 mL/Hr) IV Continuous <Continuous>    MEDICATIONS  (PRN):  acetaminophen   Tablet. 650 milliGRAM(s) Oral every 6 hours PRN Mild Pain (1 - 3)  ALBUTerol    90 MICROgram(s) HFA Inhaler 2 Puff(s) Inhalation every 6 hours PRN Shortness of Breath and/or Wheezing      Vital Signs Last 24 Hrs  T(C): 36.7 (10 Nov 2017 08:41), Max: 36.9 (09 Nov 2017 20:39)  T(F): 98.1 (10 Nov 2017 08:41), Max: 98.4 (09 Nov 2017 20:39)  HR: 62 (10 Nov 2017 08:41) (53 - 66)  BP: 123/68 (10 Nov 2017 08:41) (107/70 - 142/85)  BP(mean): --  RR: 16 (10 Nov 2017 08:41) (16 - 209)  SpO2: 96% (10 Nov 2017 08:41) (96% - 100%)  CAPILLARY BLOOD GLUCOSE      POCT Blood Glucose.: 150 mg/dL (10 Nov 2017 03:28)  POCT Blood Glucose.: 89 mg/dL (09 Nov 2017 18:35)    I&O's Summary    09 Nov 2017 07:01  -  10 Nov 2017 07:00  --------------------------------------------------------  IN: 1400 mL / OUT: 1525 mL / NET: -125 mL        PHYSICAL EXAM:  HEAD:  Atraumatic, Normocephalic  NECK: Supple, No JVD  CHEST/LUNG: Clear to auscultation bilaterally; No wheeze  HEART: Regular rate and rhythm; No murmurs, rubs, or gallops  ABDOMEN: Soft, Nontender, ; Bowel sounds   EXTREMITIES:    no  edema  NEUROLOGY:  alert  , no focal  deficit  LABS:                        10.3   9.20  )-----------( 245      ( 10 Nov 2017 07:25 )             30.6     11-10    141  |  102  |  25<H>  ----------------------------<  114<H>  4.3   |  25  |  1.34<H>    Ca    9.1      10 Nov 2017 07:29  Phos  5.3     11-10  Mg     2.1     11-10    TPro  7.5  /  Alb  4.5  /  TBili  0.2  /  DBili  x   /  AST  20  /  ALT  15  /  AlkPhos  61  11-10    PT/INR - ( 10 Nov 2017 03:52 )   PT: 10.5 sec;   INR: 0.97 ratio           CARDIAC MARKERS ( 10 Nov 2017 03:52 )  x     / <0.01 ng/mL / 54 U/L / x     / 1.9 ng/mL                Thyroid Stimulating Hormone, Serum: 0.68 uIU/mL (11-07 @ 07:33)      Consultant(s) Notes Reviewed:      Care Discussed with Consultants/Other Providers:

## 2017-11-13 NOTE — PROGRESS NOTE ADULT - PROVIDER SPECIALTY LIST ADULT
Cardiology
Internal Medicine
Intervent Cardiology
Neurology
Cardiology
Cardiology
Internal Medicine

## 2017-11-13 NOTE — DISCHARGE NOTE ADULT - HOSPITAL COURSE
82 yo female with HTN hypothyroidism asthma hx of stroke presenting with constant crampy squeezing chest pain which radiates to the back associated with chest pain.  symptoms have been going on since Friday.  received flu vaccine one week ago.  still endorses chest pain at presentation.    Dx: Chest pain s/p cath 11/9         HTN         S/p RRT vasovagal 11/10 with Right age indeterminate infarct on CT 82 yo female with HTN hypothyroidism asthma hx of stroke presenting with constant crampy squeezing chest pain which radiates to the back associated with chest pain.  symptoms have been going on since Friday.  received flu vaccine one week ago.  still endorses chest pain at presentation.    Dx: Chest pain s/p cath 11/9         HTN         S/p RRT vasovagal 11/10 with Right age indeterminate infarct on CT, MRI negative      PT underwent stress testing, revealed /abnormal with ischemia and escar, The left ventricular cavity appears to dilate with stress with a calculated TID ratio of 1.49 which is 	abnormal for this protocol.Transient ischemic dilation of the LV was noted.  	Dopplers ( negative for DVT),      11/9:.Pt underwent cardiac cath with Dr. Patel, non obstructive disease .        Pt had RRT for syncope, vasovagal.       CT head: Small lacunar type infarct at the level of the genu of the   internal capsule on the right age indeterminate. Studies otherwise   unremarkable. Evaluated by House neuro who recommended MRI  11/11 neuro recommended MRI head:  Age-appropriate involutional and ischemic gliotic changes.  11/12 mild decrease in h/h, Dr. Romeo consulted for possible colonoscopy . H/H remained stable.   Pt discharged home to follow up with PMD, cardiology, and GI 84 yo female with HTN hypothyroidism asthma hx of stroke presenting with constant crampy squeezing chest pain which radiates to the back associated with chest pain.  symptoms have been going on since Friday.  received flu vaccine one week ago.  still endorses chest pain at presentation.    Dx: Chest pain s/p cath 11/9         HTN         S/p RRT vasovagal 11/10 with Right age indeterminate infarct on CT, MRI negative      PT underwent stress testing, revealed /abnormal with ischemia and escar, The left ventricular cavity appears to dilate with stress with a calculated TID ratio of 1.49 which is 	abnormal for this protocol.Transient ischemic dilation of the LV was noted.  	Dopplers ( negative for DVT),      11/9:.Pt underwent cardiac cath with Dr. Patel, non obstructive disease .        Pt had RRT for syncope, vasovagal.       CT head: Small lacunar type infarct at the level of the genu of the   internal capsule on the right age indeterminate. Studies otherwise   unremarkable. Evaluated by House neuro who recommended MRI  11/11 neuro recommended MRI head:  Age-appropriate involutional and ischemic gliotic changes.  11/12 mild decrease in h/h, Dr. Romeo consulted for possible colonoscopy . H/H remained stable.   Pt discharged home to follow up with PMD, cardiology, and GI

## 2017-11-13 NOTE — PROGRESS NOTE ADULT - ASSESSMENT
pt  with  chest  pressure,  resolved  tele, sinus  bradycardia   stress test, normal  ef,  abnormal, Tid   s.p cath, non obstructive  on statin,    unable  to add  lopressor, hr  was  45  seen by card  spoke  with daughters  events  noted, pt  with brief   altered  mental  status,   ct  head .small  lacunar  infarct   monitor  on megan, for  bradycardia  sbp better with  iv fluids,  on  bp med  anemia, no melena/ brbpr, seen by gi.no w/p  needed

## 2017-11-13 NOTE — DISCHARGE NOTE ADULT - CARE PROVIDERS DIRECT ADDRESSES
,DirectAddress_Unknown,[email or web address] ,DirectAddress_Unknown,[email or web address],DirectAddress_Unknown

## 2017-11-13 NOTE — DISCHARGE NOTE ADULT - PROVIDER TOKENS
TOKEN:'[id number]',TOKEN:'[id number]' TOKEN:'[id number]',TOKEN:'[id number]',FREE:[LAST:[bahrah],PHONE:[(   )    -],FAX:[(   )    -],ADDRESS:[PMD]]

## 2017-11-13 NOTE — DISCHARGE NOTE ADULT - CARE PLAN
Principal Discharge DX:	Atypical chest pain  Goal:	To remain without reoccurring chest pain  Instructions for follow-up, activity and diet:	HOME CARE INSTRUCTIONS  For the next few days, avoid physical activities that bring on chest pain. Continue physical activities as directed.  Do not smoke.  Avoid drinking alcohol.   Only take over-the-counter or prescription medicine for pain, discomfort, or fever as directed by your caregiver.  Follow your caregiver's suggestions for further testing if your chest pain does not go away.  Keep any follow-up appointments you made. If you do not go to an appointment, you could develop lasting (chronic) problems with pain. If there is any problem keeping an appointment, you must call to reschedule.   SEEK MEDICAL CARE IF:  You think you are having problems from the medicine you are taking. Read your medicine instructions carefully.  Your chest pain does not go away, even after treatment.  You develop a rash with blisters on your chest.  SEEK IMMEDIATE MEDICAL CARE IF:  You have increased chest pain or pain that spreads to your arm, neck, jaw, back, or abdomen.   You develop shortness of breath, an increasing cough, or you are coughing up blood.  You have severe back or abdominal pain, feel nauseous, or vomit.  You develop severe weakness, fainting, or chills.  You have a fever.  THIS IS AN EMERGENCY. Do not wait to see if the pain will go away. Get medical help at once.  Secondary Diagnosis:	Hypertension  Instructions for follow-up, activity and diet:	Follow up with your medical doctor to establish long term blood pressure treatment goals.  Secondary Diagnosis:	Anemia  Instructions for follow-up, activity and diet:	Follow up with PMD for management  Secondary Diagnosis:	Chronic diastolic congestive heart failure  Instructions for follow-up, activity and diet:	Weigh yourself daily.  If you gain 3lbs in 3 days, or 5lbs in a week call your Health Care Provider.  Do not eat or drink foods containing more than 2000mg of salt (sodium) in your diet every day.  Call your Health Care Provider if you have any swelling or increased swelling in your feet, ankles, and/or stomach.  Take all of your medication as directed.  If you become dizzy call your Health Care Provider.

## 2017-11-13 NOTE — DISCHARGE NOTE ADULT - PATIENT PORTAL LINK FT
“You can access the [employer] Patient Portal, offered by [facility], by registering with the following website: [email or web address]”

## 2017-11-13 NOTE — DISCHARGE NOTE ADULT - PLAN OF CARE
To remain without reoccurring chest pain HOME CARE INSTRUCTIONS  For the next few days, avoid physical activities that bring on chest pain. Continue physical activities as directed.  Do not smoke.  Avoid drinking alcohol.   Only take over-the-counter or prescription medicine for pain, discomfort, or fever as directed by your caregiver.  Follow your caregiver's suggestions for further testing if your chest pain does not go away.  Keep any follow-up appointments you made. If you do not go to an appointment, you could develop lasting (chronic) problems with pain. If there is any problem keeping an appointment, you must call to reschedule.   SEEK MEDICAL CARE IF:  You think you are having problems from the medicine you are taking. Read your medicine instructions carefully.  Your chest pain does not go away, even after treatment.  You develop a rash with blisters on your chest.  SEEK IMMEDIATE MEDICAL CARE IF:  You have increased chest pain or pain that spreads to your arm, neck, jaw, back, or abdomen.   You develop shortness of breath, an increasing cough, or you are coughing up blood.  You have severe back or abdominal pain, feel nauseous, or vomit.  You develop severe weakness, fainting, or chills.  You have a fever.  THIS IS AN EMERGENCY. Do not wait to see if the pain will go away. Get medical help at once. Follow up with your medical doctor to establish long term blood pressure treatment goals. Follow up with PMD for management Weigh yourself daily.  If you gain 3lbs in 3 days, or 5lbs in a week call your Health Care Provider.  Do not eat or drink foods containing more than 2000mg of salt (sodium) in your diet every day.  Call your Health Care Provider if you have any swelling or increased swelling in your feet, ankles, and/or stomach.  Take all of your medication as directed.  If you become dizzy call your Health Care Provider.

## 2017-11-13 NOTE — DISCHARGE NOTE ADULT - MEDICATION SUMMARY - MEDICATIONS TO STOP TAKING
I will STOP taking the medications listed below when I get home from the hospital:    hydroCHLOROthiazide 25 mg oral tablet  -- 1 tab(s) by mouth once a day    meloxicam 15 mg oral tablet  -- 1 tab(s) by mouth once a day, As Needed

## 2017-11-13 NOTE — DISCHARGE NOTE ADULT - CONDITIONS AT DISCHARGE
pt a/ox4, VSS. Surgical site c/d/i, pain medication not required. Discharge instructions provided to patient and daughter at bedside, understanding verbalized. Ready to d/c home.

## 2017-11-13 NOTE — DISCHARGE NOTE ADULT - MEDICATION SUMMARY - MEDICATIONS TO TAKE
I will START or STAY ON the medications listed below when I get home from the hospital:    Calcium + D  -- 1 tab(s) by mouth once a day  -- Indication: For supplement    aspirin 81 mg oral delayed release tablet  -- 1 tab(s) by mouth once a day  -- Indication: For CAD    losartan 50 mg oral tablet  -- 1 tab(s) by mouth once a day  -- Indication: For HTN    simvastatin 10 mg oral tablet  -- 1 tab(s) by mouth once a day (at bedtime)  -- Indication: For HLD    Ventolin HFA 90 mcg/inh inhalation aerosol  -- 2 puff(s) inhaled , As Needed  -- Indication: For COPD    Symbicort 160 mcg-4.5 mcg/inh inhalation aerosol  -- 2 puff(s) inhaled 2 times a day  -- Indication: For COPD    amLODIPine 5 mg oral tablet  -- 1 tab(s) by mouth once a day  -- Indication: For HTN    pantoprazole 40 mg oral delayed release tablet  -- 1 tab(s) by mouth once a day (before a meal)  -- Indication: For GERD    levothyroxine 25 mcg (0.025 mg) oral tablet  -- 1 tab(s) by mouth once a day  -- Indication: For Hypothyroidism    Multiple Vitamins oral tablet  -- 1 tab(s) by mouth once a day  -- Indication: For supplement

## 2017-12-11 RX ORDER — MELOXICAM 15 MG/1
1 TABLET ORAL
Qty: 0 | Refills: 0 | COMMUNITY

## 2017-12-11 RX ORDER — ALBUTEROL 90 UG/1
2 AEROSOL, METERED ORAL
Qty: 0 | Refills: 0 | COMMUNITY

## 2017-12-11 RX ORDER — LOSARTAN POTASSIUM 100 MG/1
1 TABLET, FILM COATED ORAL
Qty: 0 | Refills: 0 | COMMUNITY

## 2017-12-11 RX ORDER — SIMVASTATIN 20 MG/1
1 TABLET, FILM COATED ORAL
Qty: 0 | Refills: 0 | COMMUNITY

## 2017-12-11 RX ORDER — BUDESONIDE AND FORMOTEROL FUMARATE DIHYDRATE 160; 4.5 UG/1; UG/1
2 AEROSOL RESPIRATORY (INHALATION)
Qty: 0 | Refills: 0 | COMMUNITY

## 2017-12-11 RX ORDER — ALBUTEROL 90 UG/1
3 AEROSOL, METERED ORAL
Qty: 0 | Refills: 0 | COMMUNITY

## 2018-07-04 PROBLEM — E03.9 HYPOTHYROIDISM, UNSPECIFIED: Chronic | Status: ACTIVE | Noted: 2017-11-06

## 2018-07-04 PROBLEM — I63.9 CEREBRAL INFARCTION, UNSPECIFIED: Chronic | Status: ACTIVE | Noted: 2017-11-06

## 2018-07-04 PROBLEM — I10 ESSENTIAL (PRIMARY) HYPERTENSION: Chronic | Status: ACTIVE | Noted: 2017-11-06

## 2018-08-03 ENCOUNTER — APPOINTMENT (OUTPATIENT)
Dept: ORTHOPEDIC SURGERY | Facility: CLINIC | Age: 83
End: 2018-08-03

## 2018-11-01 ENCOUNTER — OUTPATIENT (OUTPATIENT)
Dept: OUTPATIENT SERVICES | Facility: HOSPITAL | Age: 83
LOS: 1 days | End: 2018-11-01
Payer: MEDICAID

## 2018-11-01 DIAGNOSIS — Z98.51 TUBAL LIGATION STATUS: Chronic | ICD-10-CM

## 2018-11-01 PROCEDURE — G9001: CPT

## 2018-11-12 ENCOUNTER — APPOINTMENT (OUTPATIENT)
Dept: ORTHOPEDIC SURGERY | Facility: CLINIC | Age: 83
End: 2018-11-12
Payer: MEDICAID

## 2018-11-12 VITALS
HEIGHT: 62 IN | DIASTOLIC BLOOD PRESSURE: 69 MMHG | SYSTOLIC BLOOD PRESSURE: 158 MMHG | HEART RATE: 61 BPM | BODY MASS INDEX: 31.28 KG/M2 | WEIGHT: 170 LBS

## 2018-11-12 DIAGNOSIS — Z87.39 PERSONAL HISTORY OF OTHER DISEASES OF THE MUSCULOSKELETAL SYSTEM AND CONNECTIVE TISSUE: ICD-10-CM

## 2018-11-12 DIAGNOSIS — Z86.79 PERSONAL HISTORY OF OTHER DISEASES OF THE CIRCULATORY SYSTEM: ICD-10-CM

## 2018-11-12 DIAGNOSIS — Z87.09 PERSONAL HISTORY OF OTHER DISEASES OF THE RESPIRATORY SYSTEM: ICD-10-CM

## 2018-11-12 DIAGNOSIS — Z78.9 OTHER SPECIFIED HEALTH STATUS: ICD-10-CM

## 2018-11-12 PROCEDURE — 20611 DRAIN/INJ JOINT/BURSA W/US: CPT | Mod: RT

## 2018-11-12 PROCEDURE — 73564 X-RAY EXAM KNEE 4 OR MORE: CPT | Mod: RT

## 2018-11-12 PROCEDURE — 99203 OFFICE O/P NEW LOW 30 MIN: CPT | Mod: 25

## 2018-11-12 RX ORDER — SIMVASTATIN 80 MG/1
TABLET, FILM COATED ORAL
Refills: 0 | Status: ACTIVE | COMMUNITY

## 2018-11-12 RX ORDER — LEVOTHYROXINE SODIUM 0.07 MG/1
75 TABLET ORAL
Refills: 0 | Status: ACTIVE | COMMUNITY

## 2018-11-12 RX ORDER — AMLODIPINE BESYLATE 5 MG/1
TABLET ORAL
Refills: 0 | Status: ACTIVE | COMMUNITY

## 2018-11-12 RX ORDER — LOSARTAN POTASSIUM 100 MG/1
TABLET, FILM COATED ORAL
Refills: 0 | Status: ACTIVE | COMMUNITY

## 2018-11-12 RX ORDER — ASPIRIN 325 MG/1
TABLET, FILM COATED ORAL
Refills: 0 | Status: ACTIVE | COMMUNITY

## 2018-11-25 ENCOUNTER — INPATIENT (INPATIENT)
Facility: HOSPITAL | Age: 83
LOS: 1 days | Discharge: HOME | End: 2018-11-27
Attending: INTERNAL MEDICINE | Admitting: INTERNAL MEDICINE
Payer: MEDICAID

## 2018-11-25 VITALS
OXYGEN SATURATION: 100 % | SYSTOLIC BLOOD PRESSURE: 142 MMHG | TEMPERATURE: 98 F | RESPIRATION RATE: 16 BRPM | HEART RATE: 62 BPM | DIASTOLIC BLOOD PRESSURE: 63 MMHG

## 2018-11-25 DIAGNOSIS — Z98.51 TUBAL LIGATION STATUS: Chronic | ICD-10-CM

## 2018-11-25 LAB
ALBUMIN SERPL ELPH-MCNC: 4.4 G/DL — SIGNIFICANT CHANGE UP (ref 3.3–5)
ALP SERPL-CCNC: 66 U/L — SIGNIFICANT CHANGE UP (ref 40–120)
ALT FLD-CCNC: 19 U/L — SIGNIFICANT CHANGE UP (ref 4–33)
AST SERPL-CCNC: 43 U/L — HIGH (ref 4–32)
B PERT DNA SPEC QL NAA+PROBE: NOT DETECTED — SIGNIFICANT CHANGE UP
BASE EXCESS BLDV CALC-SCNC: -0.7 MMOL/L — SIGNIFICANT CHANGE UP
BASOPHILS # BLD AUTO: 0.04 K/UL — SIGNIFICANT CHANGE UP (ref 0–0.2)
BASOPHILS NFR BLD AUTO: 0.3 % — SIGNIFICANT CHANGE UP (ref 0–2)
BILIRUB SERPL-MCNC: 0.3 MG/DL — SIGNIFICANT CHANGE UP (ref 0.2–1.2)
BLOOD GAS VENOUS - CREATININE: 0.96 MG/DL — SIGNIFICANT CHANGE UP (ref 0.5–1.3)
BUN SERPL-MCNC: 16 MG/DL — SIGNIFICANT CHANGE UP (ref 7–23)
C PNEUM DNA SPEC QL NAA+PROBE: NOT DETECTED — SIGNIFICANT CHANGE UP
CALCIUM SERPL-MCNC: 10.1 MG/DL — SIGNIFICANT CHANGE UP (ref 8.4–10.5)
CHLORIDE BLDV-SCNC: 105 MMOL/L — SIGNIFICANT CHANGE UP (ref 96–108)
CHLORIDE SERPL-SCNC: 105 MMOL/L — SIGNIFICANT CHANGE UP (ref 98–107)
CO2 SERPL-SCNC: 18 MMOL/L — LOW (ref 22–31)
CREAT SERPL-MCNC: 0.95 MG/DL — SIGNIFICANT CHANGE UP (ref 0.5–1.3)
EOSINOPHIL # BLD AUTO: 0.22 K/UL — SIGNIFICANT CHANGE UP (ref 0–0.5)
EOSINOPHIL NFR BLD AUTO: 1.7 % — SIGNIFICANT CHANGE UP (ref 0–6)
FLUAV H1 2009 PAND RNA SPEC QL NAA+PROBE: NOT DETECTED — SIGNIFICANT CHANGE UP
FLUAV H1 RNA SPEC QL NAA+PROBE: NOT DETECTED — SIGNIFICANT CHANGE UP
FLUAV H3 RNA SPEC QL NAA+PROBE: NOT DETECTED — SIGNIFICANT CHANGE UP
FLUAV SUBTYP SPEC NAA+PROBE: SIGNIFICANT CHANGE UP
FLUBV RNA SPEC QL NAA+PROBE: NOT DETECTED — SIGNIFICANT CHANGE UP
GAS PNL BLDV: 142 MMOL/L — SIGNIFICANT CHANGE UP (ref 136–146)
GLUCOSE BLDV-MCNC: 133 — HIGH (ref 70–99)
GLUCOSE SERPL-MCNC: 130 MG/DL — HIGH (ref 70–99)
HADV DNA SPEC QL NAA+PROBE: NOT DETECTED — SIGNIFICANT CHANGE UP
HCO3 BLDV-SCNC: 22 MMOL/L — SIGNIFICANT CHANGE UP (ref 20–27)
HCOV PNL SPEC NAA+PROBE: SIGNIFICANT CHANGE UP
HCT VFR BLD CALC: 35.2 % — SIGNIFICANT CHANGE UP (ref 34.5–45)
HCT VFR BLDV CALC: 36.7 % — SIGNIFICANT CHANGE UP (ref 34.5–45)
HGB BLD-MCNC: 11.7 G/DL — SIGNIFICANT CHANGE UP (ref 11.5–15.5)
HGB BLDV-MCNC: 11.9 G/DL — SIGNIFICANT CHANGE UP (ref 11.5–15.5)
HMPV RNA SPEC QL NAA+PROBE: NOT DETECTED — SIGNIFICANT CHANGE UP
HPIV1 RNA SPEC QL NAA+PROBE: NOT DETECTED — SIGNIFICANT CHANGE UP
HPIV2 RNA SPEC QL NAA+PROBE: NOT DETECTED — SIGNIFICANT CHANGE UP
HPIV3 RNA SPEC QL NAA+PROBE: NOT DETECTED — SIGNIFICANT CHANGE UP
HPIV4 RNA SPEC QL NAA+PROBE: NOT DETECTED — SIGNIFICANT CHANGE UP
IMM GRANULOCYTES # BLD AUTO: 0.06 # — SIGNIFICANT CHANGE UP
IMM GRANULOCYTES NFR BLD AUTO: 0.5 % — SIGNIFICANT CHANGE UP (ref 0–1.5)
LACTATE BLDV-MCNC: 2.3 MMOL/L — HIGH (ref 0.5–2)
LYMPHOCYTES # BLD AUTO: 1.58 K/UL — SIGNIFICANT CHANGE UP (ref 1–3.3)
LYMPHOCYTES # BLD AUTO: 11.9 % — LOW (ref 13–44)
MCHC RBC-ENTMCNC: 30.2 PG — SIGNIFICANT CHANGE UP (ref 27–34)
MCHC RBC-ENTMCNC: 33.2 % — SIGNIFICANT CHANGE UP (ref 32–36)
MCV RBC AUTO: 91 FL — SIGNIFICANT CHANGE UP (ref 80–100)
MONOCYTES # BLD AUTO: 0.66 K/UL — SIGNIFICANT CHANGE UP (ref 0–0.9)
MONOCYTES NFR BLD AUTO: 5 % — SIGNIFICANT CHANGE UP (ref 2–14)
NEUTROPHILS # BLD AUTO: 10.67 K/UL — HIGH (ref 1.8–7.4)
NEUTROPHILS NFR BLD AUTO: 80.6 % — HIGH (ref 43–77)
NRBC # FLD: 0 — SIGNIFICANT CHANGE UP
PCO2 BLDV: 48 MMHG — SIGNIFICANT CHANGE UP (ref 41–51)
PH BLDV: 7.33 PH — SIGNIFICANT CHANGE UP (ref 7.32–7.43)
PLATELET # BLD AUTO: 243 K/UL — SIGNIFICANT CHANGE UP (ref 150–400)
PMV BLD: 10.8 FL — SIGNIFICANT CHANGE UP (ref 7–13)
PO2 BLDV: < 24 MMHG — LOW (ref 35–40)
POTASSIUM BLDV-SCNC: 3.6 MMOL/L — SIGNIFICANT CHANGE UP (ref 3.4–4.5)
POTASSIUM SERPL-MCNC: 5.3 MMOL/L — SIGNIFICANT CHANGE UP (ref 3.5–5.3)
POTASSIUM SERPL-SCNC: 5.3 MMOL/L — SIGNIFICANT CHANGE UP (ref 3.5–5.3)
PROT SERPL-MCNC: 8.2 G/DL — SIGNIFICANT CHANGE UP (ref 6–8.3)
RBC # BLD: 3.87 M/UL — SIGNIFICANT CHANGE UP (ref 3.8–5.2)
RBC # FLD: 13.5 % — SIGNIFICANT CHANGE UP (ref 10.3–14.5)
RSV RNA SPEC QL NAA+PROBE: NOT DETECTED — SIGNIFICANT CHANGE UP
RV+EV RNA SPEC QL NAA+PROBE: NOT DETECTED — SIGNIFICANT CHANGE UP
SAO2 % BLDV: 27.1 % — LOW (ref 60–85)
SODIUM SERPL-SCNC: 140 MMOL/L — SIGNIFICANT CHANGE UP (ref 135–145)
TROPONIN T, HIGH SENSITIVITY: 9 NG/L — SIGNIFICANT CHANGE UP (ref ?–14)
TSH SERPL-MCNC: 1.72 UIU/ML — SIGNIFICANT CHANGE UP (ref 0.27–4.2)
WBC # BLD: 13.23 K/UL — HIGH (ref 3.8–10.5)
WBC # FLD AUTO: 13.23 K/UL — HIGH (ref 3.8–10.5)

## 2018-11-25 PROCEDURE — 71045 X-RAY EXAM CHEST 1 VIEW: CPT | Mod: 26

## 2018-11-25 PROCEDURE — 74177 CT ABD & PELVIS W/CONTRAST: CPT | Mod: 26

## 2018-11-25 PROCEDURE — 71260 CT THORAX DX C+: CPT | Mod: 26

## 2018-11-25 RX ORDER — IPRATROPIUM/ALBUTEROL SULFATE 18-103MCG
3 AEROSOL WITH ADAPTER (GRAM) INHALATION ONCE
Qty: 0 | Refills: 0 | Status: COMPLETED | OUTPATIENT
Start: 2018-11-25 | End: 2018-11-25

## 2018-11-25 RX ORDER — KETOROLAC TROMETHAMINE 30 MG/ML
15 SYRINGE (ML) INJECTION ONCE
Qty: 0 | Refills: 0 | Status: DISCONTINUED | OUTPATIENT
Start: 2018-11-25 | End: 2018-11-25

## 2018-11-25 RX ORDER — ONDANSETRON 8 MG/1
4 TABLET, FILM COATED ORAL ONCE
Qty: 0 | Refills: 0 | Status: COMPLETED | OUTPATIENT
Start: 2018-11-25 | End: 2018-11-25

## 2018-11-25 RX ADMIN — Medication 3 MILLILITER(S): at 20:21

## 2018-11-25 RX ADMIN — Medication 15 MILLIGRAM(S): at 21:30

## 2018-11-25 RX ADMIN — Medication 3 MILLILITER(S): at 19:09

## 2018-11-25 RX ADMIN — Medication 60 MILLIGRAM(S): at 20:21

## 2018-11-25 RX ADMIN — Medication 15 MILLIGRAM(S): at 20:55

## 2018-11-25 RX ADMIN — Medication 3 MILLILITER(S): at 19:47

## 2018-11-25 NOTE — ED PROVIDER NOTE - OBJECTIVE STATEMENT
84y F hx HTN, hypothyroidism, asthma, hx of stroke now p/w several days progressively worsening epigastric pain, sob and dizziness. +Productive cough.  Denies fever.  EMS gave duneb to some relief. 84y F hx HTN, hypothyroidism, asthma, hx of stroke now p/w 3 days progressively worsening epigastric pain, sob and dizziness. +Productive cough.  Denies fever.  EMS gave duneb to some relief. Ill appearing, tachypneic upon arrival RR > 35.  Sat 100% on room air.  no recent travel or known sick contacts.     Speaks creole primarily, requesting family to interpret.

## 2018-11-25 NOTE — ED PROVIDER NOTE - ATTENDING CONTRIBUTION TO CARE
Attending Attestation: Dr. Cotton  I have personally performed a history and physical examination of the patient and discussed management with the resident as well as the patient.  I reviewed the resident's note and agree with the documented findings and plan of care.  I have authored and modified critical sections of the Provider Note, including but not limited to HPI, Physical Exam and MDM. Pt c complex PMHx and asthma p/w resp distress, likely 2/2 RAD - steroids & nebs, consider Mag & BiPAP.  Will provide O2 for comfort, d/t distress.  Also, possible viral syndrome/flu? PNA?   CT for abd pain and tenderness. Rectal temp.  Tata admit.

## 2018-11-25 NOTE — ED ADULT NURSE NOTE - OBJECTIVE STATEMENT
Received pt. in room 15 alert and oriented x 4 complaining of shortness of breath, persistent cough, and chest pain with coughing. Pt. have a history of HTN, HLD, Creole speaking daughter Anastasia provided translation. Anastasia stated " my mother started coughing this morning and it got worst with some shortness of breath, her chest hurt when she cough". Medicated as ordered, labs sent, will continue to monitor.

## 2018-11-25 NOTE — ED ADULT NURSE NOTE - NSIMPLEMENTINTERV_GEN_ALL_ED
Implemented All Fall Risk Interventions:  [place] to call system. Call bell, personal items and telephone within reach. Instruct patient to call for assistance. Room bathroom lighting operational. Non-slip footwear when patient is off stretcher. Physically safe environment: no spills, clutter or unnecessary equipment. Stretcher in lowest position, wheels locked, appropriate side rails in place. Provide visual cue, wrist band, yellow gown, etc. Monitor gait and stability. Monitor for mental status changes and reorient to person, place, and time. Review medications for side effects contributing to fall risk. Reinforce activity limits and safety measures with patient and family.

## 2018-11-25 NOTE — ED ADULT NURSE REASSESSMENT NOTE - NS ED NURSE REASSESS COMMENT FT1
pt received alert and oriented x3. respirations equal and unlabored. pt denies any pain at the moment. skin intact Call bell in reach, warm blanket provided, bed in lowest position, side rails up x2,safety maintained. will continue to monitor.

## 2018-11-25 NOTE — ED PROVIDER NOTE - PMH
CVA (cerebral vascular accident)    High Cholesterol (ICD9 272.0)    HTN - Hypertension    Hypertension    Hypothyroidism Asthma    CVA (cerebral vascular accident)    High Cholesterol (ICD9 272.0)    HTN - Hypertension    Hypertension    Hypothyroidism

## 2018-11-25 NOTE — ED PROVIDER NOTE - PSH
Benign Essential Hypertension (ICD9 401.1)    Benign Essential Hypertension (ICD9 401.1)    H/O tubal ligation    Hyperlipidemia (ICD9 272.4)

## 2018-11-25 NOTE — ED PROVIDER NOTE - MEDICAL DECISION MAKING DETAILS
resp distress likely 2/2 RAD - steroids & nebs, consider Mag & BiPAP  CT for abd pain.    rectal temp Pt c complex PMHx and asthma p/w resp distress, likely 2/2 RAD - steroids & nebs, consider Mag & BiPAP.  Will provide O2 for comfort, d/t distress.  Also, possible viral syndrome/flu? PNA?   CT for abd pain and tenderness. Rectal temp.  Tata admit.

## 2018-11-25 NOTE — ED PROVIDER NOTE - PHYSICAL EXAMINATION
No abd bruits or thrills.  No pulsatile abd masses.  No pulse deficits x4 ext.  RR 25-30 on exam No abd bruits or thrills.  No pulsatile abd masses.  No pulse deficits x4 ext.  RR 25-30 on exam (now with O2 4LPM via NC)

## 2018-11-26 ENCOUNTER — TRANSCRIPTION ENCOUNTER (OUTPATIENT)
Age: 83
End: 2018-11-26

## 2018-11-26 DIAGNOSIS — I10 ESSENTIAL (PRIMARY) HYPERTENSION: ICD-10-CM

## 2018-11-26 DIAGNOSIS — N28.89 OTHER SPECIFIED DISORDERS OF KIDNEY AND URETER: ICD-10-CM

## 2018-11-26 DIAGNOSIS — J45.901 UNSPECIFIED ASTHMA WITH (ACUTE) EXACERBATION: ICD-10-CM

## 2018-11-26 DIAGNOSIS — Z29.9 ENCOUNTER FOR PROPHYLACTIC MEASURES, UNSPECIFIED: ICD-10-CM

## 2018-11-26 DIAGNOSIS — H25.093 OTHER AGE-RELATED INCIPIENT CATARACT, BILATERAL: Chronic | ICD-10-CM

## 2018-11-26 DIAGNOSIS — J45.21 MILD INTERMITTENT ASTHMA WITH (ACUTE) EXACERBATION: ICD-10-CM

## 2018-11-26 DIAGNOSIS — I63.9 CEREBRAL INFARCTION, UNSPECIFIED: ICD-10-CM

## 2018-11-26 DIAGNOSIS — E03.9 HYPOTHYROIDISM, UNSPECIFIED: ICD-10-CM

## 2018-11-26 PROCEDURE — 70450 CT HEAD/BRAIN W/O DYE: CPT | Mod: 26

## 2018-11-26 PROCEDURE — 99223 1ST HOSP IP/OBS HIGH 75: CPT

## 2018-11-26 RX ORDER — ALBUTEROL 90 UG/1
1 AEROSOL, METERED ORAL EVERY 4 HOURS
Qty: 0 | Refills: 0 | Status: DISCONTINUED | OUTPATIENT
Start: 2018-11-26 | End: 2018-11-27

## 2018-11-26 RX ORDER — LOSARTAN POTASSIUM 100 MG/1
50 TABLET, FILM COATED ORAL DAILY
Qty: 0 | Refills: 0 | Status: DISCONTINUED | OUTPATIENT
Start: 2018-11-26 | End: 2018-11-26

## 2018-11-26 RX ORDER — IPRATROPIUM/ALBUTEROL SULFATE 18-103MCG
3 AEROSOL WITH ADAPTER (GRAM) INHALATION EVERY 6 HOURS
Qty: 0 | Refills: 0 | Status: DISCONTINUED | OUTPATIENT
Start: 2018-11-26 | End: 2018-11-27

## 2018-11-26 RX ORDER — ASPIRIN/CALCIUM CARB/MAGNESIUM 324 MG
81 TABLET ORAL DAILY
Qty: 0 | Refills: 0 | Status: DISCONTINUED | OUTPATIENT
Start: 2018-11-26 | End: 2018-11-27

## 2018-11-26 RX ORDER — AMLODIPINE BESYLATE 2.5 MG/1
5 TABLET ORAL DAILY
Qty: 0 | Refills: 0 | Status: DISCONTINUED | OUTPATIENT
Start: 2018-11-26 | End: 2018-11-26

## 2018-11-26 RX ORDER — LOSARTAN POTASSIUM 100 MG/1
50 TABLET, FILM COATED ORAL DAILY
Qty: 0 | Refills: 0 | Status: DISCONTINUED | OUTPATIENT
Start: 2018-11-26 | End: 2018-11-27

## 2018-11-26 RX ORDER — PANTOPRAZOLE SODIUM 20 MG/1
40 TABLET, DELAYED RELEASE ORAL
Qty: 0 | Refills: 0 | Status: DISCONTINUED | OUTPATIENT
Start: 2018-11-26 | End: 2018-11-27

## 2018-11-26 RX ORDER — ENOXAPARIN SODIUM 100 MG/ML
40 INJECTION SUBCUTANEOUS EVERY 24 HOURS
Qty: 0 | Refills: 0 | Status: DISCONTINUED | OUTPATIENT
Start: 2018-11-26 | End: 2018-11-27

## 2018-11-26 RX ORDER — AMLODIPINE BESYLATE 2.5 MG/1
5 TABLET ORAL DAILY
Qty: 0 | Refills: 0 | Status: DISCONTINUED | OUTPATIENT
Start: 2018-11-26 | End: 2018-11-27

## 2018-11-26 RX ORDER — LEVOTHYROXINE SODIUM 125 MCG
25 TABLET ORAL DAILY
Qty: 0 | Refills: 0 | Status: DISCONTINUED | OUTPATIENT
Start: 2018-11-26 | End: 2018-11-27

## 2018-11-26 RX ORDER — ACETAMINOPHEN 500 MG
650 TABLET ORAL EVERY 6 HOURS
Qty: 0 | Refills: 0 | Status: DISCONTINUED | OUTPATIENT
Start: 2018-11-26 | End: 2018-11-27

## 2018-11-26 RX ORDER — TIOTROPIUM BROMIDE 18 UG/1
1 CAPSULE ORAL; RESPIRATORY (INHALATION) DAILY
Qty: 0 | Refills: 0 | Status: DISCONTINUED | OUTPATIENT
Start: 2018-11-26 | End: 2018-11-27

## 2018-11-26 RX ORDER — SIMVASTATIN 20 MG/1
10 TABLET, FILM COATED ORAL AT BEDTIME
Qty: 0 | Refills: 0 | Status: DISCONTINUED | OUTPATIENT
Start: 2018-11-26 | End: 2018-11-27

## 2018-11-26 RX ADMIN — Medication 25 MICROGRAM(S): at 16:13

## 2018-11-26 RX ADMIN — Medication 3 MILLILITER(S): at 21:48

## 2018-11-26 RX ADMIN — Medication 81 MILLIGRAM(S): at 16:12

## 2018-11-26 RX ADMIN — AMLODIPINE BESYLATE 5 MILLIGRAM(S): 2.5 TABLET ORAL at 16:13

## 2018-11-26 RX ADMIN — Medication 650 MILLIGRAM(S): at 18:45

## 2018-11-26 RX ADMIN — Medication 650 MILLIGRAM(S): at 19:33

## 2018-11-26 RX ADMIN — LOSARTAN POTASSIUM 50 MILLIGRAM(S): 100 TABLET, FILM COATED ORAL at 16:13

## 2018-11-26 RX ADMIN — SIMVASTATIN 10 MILLIGRAM(S): 20 TABLET, FILM COATED ORAL at 21:39

## 2018-11-26 RX ADMIN — ENOXAPARIN SODIUM 40 MILLIGRAM(S): 100 INJECTION SUBCUTANEOUS at 16:13

## 2018-11-26 RX ADMIN — Medication 1 TABLET(S): at 16:12

## 2018-11-26 RX ADMIN — Medication 3 MILLILITER(S): at 15:18

## 2018-11-26 NOTE — H&P ADULT - PROBLEM SELECTOR PLAN 1
CXR and CT chest without evidence of pneumonia.  RVP negative  Leukocytosis in setting of distress and solumedrol use, no fever.  can start prednisone PO 50mg tomorrow  DuoNeb prn.  monitor respiratory status  Os via NC prn.

## 2018-11-26 NOTE — H&P ADULT - PMH
Asthma    CVA (cerebral vascular accident)    High Cholesterol (ICD9 272.0)    HTN - Hypertension    Hypertension    Hypothyroidism

## 2018-11-26 NOTE — H&P ADULT - PSH
Age-related incipient cataract of both eyes    Benign Essential Hypertension (ICD9 401.1)    Benign Essential Hypertension (ICD9 401.1)    H/O tubal ligation    Hyperlipidemia (ICD9 272.4)

## 2018-11-26 NOTE — H&P ADULT - NSHPPHYSICALEXAM_GEN_ALL_CORE
T(C): 36.9 (11-26-18 @ 06:54), Max: 37.1 (11-26-18 @ 03:51)  HR: 81 (11-26-18 @ 06:54) (62 - 81)  BP: 138/68 (11-26-18 @ 06:54) (125/58 - 157/64)  RR: 18 (11-26-18 @ 06:54) (16 - 20)  SpO2: 96% (11-26-18 @ 06:54) (96% - 100%)    GENERAL: no apparent distress, on room air  HEAD:  Atraumatic, Normocephalic  EYES: EOMI, PERRLA, conjunctiva and sclera clear b/l  CHEST/LUNG: Clear to auscultation bilaterally; No wheezing or crackles  HEART: Regular rate and rhythm; No murmurs, rubs, or gallops  ABDOMEN: Soft, Nontender, Nondistended; Bowel sounds present  EXTREMITIES:  2+ Peripheral Pulses, No clubbing, cyanosis, or edema  PSYCH: normal affect, calm demeanor  NEUROLOGY: AAOX3, no sensory or motor deficits, 5/5 muscle strength equal in all extremities, CN 2-12 grossly intact  SKIN: No rashes or lesions

## 2018-11-26 NOTE — DISCHARGE NOTE ADULT - PLAN OF CARE
Remain exacerbation free Continue your inhalers and annual pulmonary function tests with your outpatient provider. Monitor for asthma exacerbation, such as, shortness of breath, hyperventilation, or any other difficulties breathing and report to the emergency room in the event that your rescue inhaler has not resolved your symptoms. Continue current blood pressure medication regimen as directed. Monitor for any visual changes, headaches or dizziness.  Monitor blood pressure regularly.  Follow up with your PCP for further management for high blood pressure, please call to make appointment within 1 week of discharge Continue your thyroid medications as recommended and follow-up with your outpatient provider for continual thyroid function testing and further medication management. Continue with ASA. Incidental finding of 1.3cm Lt renal mass. US --------- Continue your inhalers and annual pulmonary function tests with your outpatient provider. You can call Dr Martinez (pulm) who saw you at the hospital.  Complete Prednisone taper as follows; Prednisone 10 mg tab-take 4 tabs x 2 days  3 tabs x 2 days, 2 tabs x 2 days then 1 tab x 2 days , then stop   Monitor for asthma exacerbation, such as, shortness of breath, hyperventilation, or any other difficulties breathing and report to the emergency room in the event that your rescue inhaler has not resolved your symptoms. BP control Euthyroid symptoms control Continue with Aspirin and simvastatin further follow up Incidental finding of 1.3cm Lt renal mass on abd CT  US showed- 1.6 cm exophytic simple cyst arising from lower pole of left kidney which corresponds to indeterminate lesion noted on CT chest, abdomen and pelvis dated 11/25/2018.

## 2018-11-26 NOTE — H&P ADULT - ASSESSMENT
84F with PMHx HTN, hypothyroid, Asthma, CVA presented with acute onset shortness of breath due to asthma exacerbation. Patient symptom improved with duoneb and solumedrol. Admit to medicine for monitoring of respiratory status. Incidental finding of Left renal mass on CT A/P.

## 2018-11-26 NOTE — H&P ADULT - HISTORY OF PRESENT ILLNESS
84F with PMHx HTN, Asthma, Hypothyroidism, OA presented with shortness of breath. Per daughter at bedside who provided the information, patient started having symptoms yesterday afternoon while going to the bathroom. She suddenly experienced shortness of breath and difficulty breathing, associated with coughing with white sputum at the time. Prior to yesterday, patient was in her normal state of health. Denied any fever, chill, coughing, SOB, chest pain prior to the event. At baseline, patient exertion is limited due to low back pain. uses albuterol 2-3 times per week and 1-2 times night time awakening. never been intubated. Patient was admitted 1 year prior for similar symptoms and had negative angiogram at the time.     In ED, patient was afebrile and hemodynamically stable. Patient was initially in respiratory distress, which improved with Duoneb and Solumedrol. Patient had CT chest/abd/Pelvis which were grossly unremarkable. Patient subsequently developed headache and dizziness, CT head was negative. Labs were grossly unremarkable except for mild leukocytosis.

## 2018-11-26 NOTE — DISCHARGE NOTE ADULT - CARE PROVIDER_API CALL
Hollie Pride), Internal Medicine  [address] [place] [address]  Phone: [phone]  Fax: [phone] Hollie Pride), Internal Medicine  [address] [place] [address]  Phone: [phone]  Fax: [phone]    Gavino Martinez), Critical Care Medicine; Internal Medicine; Pulmonary Disease  [id number] [place] [address]  Phone: [phone]  Fax: [phone]

## 2018-11-26 NOTE — DISCHARGE NOTE ADULT - MEDICATION SUMMARY - MEDICATIONS TO TAKE
I will START or STAY ON the medications listed below when I get home from the hospital:    Calcium + D  -- 1 tab(s) by mouth once a day  -- Indication: For Prophylactic measure    predniSONE 10 mg oral tablet  -- 4 tabs x 2 days  3 tabs x 2 days  2 tabs x 2 days then 1 tab x 2 days   then stop   -- It is very important that you take or use this exactly as directed.  Do not skip doses or discontinue unless directed by your doctor.  Obtain medical advice before taking any non-prescription drugs as some may affect the action of this medication.  Take with food or milk.    -- Indication: For Asthma with acute exacerbation    aspirin 81 mg oral delayed release tablet  -- 1 tab(s) by mouth once a day  -- Indication: For Prophylactic measure    losartan 50 mg oral tablet  -- 1 tab(s) by mouth once a day  -- Indication: For Essential hypertension    simvastatin 10 mg oral tablet  -- 1 tab(s) by mouth once a day (at bedtime)  -- Indication: For Cerebrovascular accident (CVA), unspecified mechanism    Ventolin HFA 90 mcg/inh inhalation aerosol  -- 2 puff(s) inhaled , As Needed  -- Indication: For Asthma    Symbicort 160 mcg-4.5 mcg/inh inhalation aerosol  -- 2 puff(s) inhaled 2 times a day  -- Indication: For Asthma    tiotropium 18 mcg inhalation capsule  -- 1 cap(s) inhaled once a day  -- Indication: For Asthma    amLODIPine 5 mg oral tablet  -- 1 tab(s) by mouth once a day  -- Indication: For Essential hypertension    montelukast 10 mg oral tablet  -- 1 tab(s) by mouth once a day (at bedtime)   -- Indication: For Asthma    pantoprazole 40 mg oral delayed release tablet  -- 1 tab(s) by mouth once a day (before a meal)  -- Indication: For Prophylactic measure    levothyroxine 25 mcg (0.025 mg) oral tablet  -- 1 tab(s) by mouth once a day  -- Indication: For Acquired hypothyroidism    Multiple Vitamins oral tablet  -- 1 tab(s) by mouth once a day  -- Indication: For Prophylactic measure

## 2018-11-26 NOTE — H&P ADULT - NSHPREVIEWOFSYSTEMS_GEN_ALL_CORE
CONSTITUTIONAL: No fever, chills, night sweats, weight loss, or fatigue  EYES: No eye pain, blurry vision, or discharge  ENMT:  No difficulty hearing, tinnitus, vertigo; No sinus or throat pain  NECK: No pain or stiffness  BREASTS: No pain, masses, or nipple discharge  RESPIRATORY: +cough, +wheezing, +shortness of breath  CARDIOVASCULAR: No chest pain, palpitations, dizziness, or leg swelling  GASTROINTESTINAL: No abdominal or epigastric pain. No nausea, vomiting, or hematemesis; No diarrhea or constipation. No melena or hematochezia.  GENITOURINARY: No dysuria, frequency, hematuria, or incontinence  NEUROLOGICAL: No headaches, memory loss, loss of strength, numbness, or tremors  SKIN: No itching, burning, rashes, or lesions   LYMPH NODES: No enlarged glands  ENDOCRINE: No heat or cold intolerance; No hair loss  MUSCULOSKELETAL: +back pain; No other joint pain or swelling; No muscle, back, or extremity pain  PSYCHIATRIC: No depression, anxiety, mood swings, or difficulty sleeping  HEME/LYMPH: No easy bruising, or bleeding gums  ALLERY AND IMMUNOLOGIC: No hives or eczema

## 2018-11-26 NOTE — DISCHARGE NOTE ADULT - SECONDARY DIAGNOSIS.
Essential hypertension Acquired hypothyroidism Cerebrovascular accident (CVA), unspecified mechanism Left renal mass

## 2018-11-26 NOTE — DISCHARGE NOTE ADULT - PATIENT PORTAL LINK FT
You can access the [facility] Patient Portal, offered by [facility], by registering with the following website: [email or web address]

## 2018-11-26 NOTE — DISCHARGE NOTE ADULT - CARE PLAN
Principal Discharge DX:	Asthma exacerbation  Goal:	Remain exacerbation free  Assessment and plan of treatment:	Continue your inhalers and annual pulmonary function tests with your outpatient provider. Monitor for asthma exacerbation, such as, shortness of breath, hyperventilation, or any other difficulties breathing and report to the emergency room in the event that your rescue inhaler has not resolved your symptoms.  Secondary Diagnosis:	Essential hypertension  Assessment and plan of treatment:	Continue current blood pressure medication regimen as directed. Monitor for any visual changes, headaches or dizziness.  Monitor blood pressure regularly.  Follow up with your PCP for further management for high blood pressure, please call to make appointment within 1 week of discharge  Secondary Diagnosis:	Acquired hypothyroidism  Assessment and plan of treatment:	Continue your thyroid medications as recommended and follow-up with your outpatient provider for continual thyroid function testing and further medication management.  Secondary Diagnosis:	Cerebrovascular accident (CVA), unspecified mechanism  Assessment and plan of treatment:	Continue with ASA.  Secondary Diagnosis:	Left renal mass  Assessment and plan of treatment:	Incidental finding of 1.3cm Lt renal mass. US --------- Principal Discharge DX:	Asthma exacerbation  Goal:	Remain exacerbation free  Assessment and plan of treatment:	Continue your inhalers and annual pulmonary function tests with your outpatient provider. You can call Dr Martinez (pulm) who saw you at the hospital.  Complete Prednisone taper as follows; Prednisone 10 mg tab-take 4 tabs x 2 days  3 tabs x 2 days, 2 tabs x 2 days then 1 tab x 2 days , then stop   Monitor for asthma exacerbation, such as, shortness of breath, hyperventilation, or any other difficulties breathing and report to the emergency room in the event that your rescue inhaler has not resolved your symptoms.  Secondary Diagnosis:	Essential hypertension  Goal:	BP control  Assessment and plan of treatment:	Continue current blood pressure medication regimen as directed. Monitor for any visual changes, headaches or dizziness.  Monitor blood pressure regularly.  Follow up with your PCP for further management for high blood pressure, please call to make appointment within 1 week of discharge  Secondary Diagnosis:	Acquired hypothyroidism  Goal:	Euthyroid  Assessment and plan of treatment:	Continue your thyroid medications as recommended and follow-up with your outpatient provider for continual thyroid function testing and further medication management.  Secondary Diagnosis:	Cerebrovascular accident (CVA), unspecified mechanism  Goal:	symptoms control  Assessment and plan of treatment:	Continue with Aspirin and simvastatin  Secondary Diagnosis:	Left renal mass  Goal:	further follow up  Assessment and plan of treatment:	Incidental finding of 1.3cm Lt renal mass on abd CT  US showed- 1.6 cm exophytic simple cyst arising from lower pole of left kidney which corresponds to indeterminate lesion noted on CT chest, abdomen and pelvis dated 11/25/2018.

## 2018-11-26 NOTE — H&P ADULT - PROBLEM SELECTOR PLAN 5
incidental finding of 1.3cm Lt renal mass  will get US for further evaluation during hospitalization

## 2018-11-26 NOTE — DISCHARGE NOTE ADULT - HOSPITAL COURSE
84F with PMHx HTN, hypothyroid, Asthma, CVA presented with acute onset shortness of breath due to asthma exacerbation. Patient symptom improved with duoneb and solumedrol. Admit to medicine for monitoring of respiratory status. Incidental finding of Left renal mass on CT A/P.     Hospital course:    Mild intermittent asthma with exacerbation.    -CXR and CT chest without evidence of pneumonia.  -RVP negative  -Leukocytosis in setting of distress and solumedrol use, no fever.  -S/p IV steroids with transition to po prednisone   -DuoNebs  -O2 via NC prn    Essential hypertension.    -Continued with home dose losartan and norvasc.     Acquired hypothyroidism.    -TSH: 1.72 WNL  -Continued with synthroid.     Cerebrovascular accident (CVA), unspecified mechanism.    -Continued with ASA.     Left renal mass.   -Incidental finding of 1.3cm Lt renal mass  -Renal US for further evaluation during hospitalization----    Prophylactic measure.   -DVT: Lovenox  -Diet: Regular/DASH. 84F with PMHx HTN, hypothyroid, Asthma, CVA presented with acute onset shortness of breath due to asthma exacerbation. Patient symptom improved with duoneb and solumedrol. Admit to medicine for monitoring of respiratory status. Incidental finding of Left renal mass on CT A/P.     Hospital course:    Mild intermittent asthma with exacerbation.    -CXR and CT chest without evidence of pneumonia.  -RVP negative  -Leukocytosis in setting of distress and solumedrol use, no fever.  -S/p IV steroids with transition to po prednisone   -DuoNebs  -O2 via NC prn    Essential hypertension.    -Continued with home dose losartan and norvasc.     Acquired hypothyroidism.    -TSH: 1.72 WNL  -Continued with synthroid.     Cerebrovascular accident (CVA), unspecified mechanism.    -Continued with ASA.     Left renal mass.    incidental finding on abd CT- of 1.3cm Lt renal mass  US 1.6 cm exophytic simple cyst arising from lower pole of left kidney which corresponds to indeterminate lesion noted on CT chest, abdomen and pelvis dated 11/25/2018. outpt follow up    Pt is optimized for DC

## 2018-11-26 NOTE — DISCHARGE NOTE ADULT - CARE PROVIDERS DIRECT ADDRESSES
,[email or web address]

## 2018-11-27 VITALS — WEIGHT: 168.43 LBS

## 2018-11-27 DIAGNOSIS — Z71.89 OTHER SPECIFIED COUNSELING: ICD-10-CM

## 2018-11-27 LAB
ALBUMIN SERPL ELPH-MCNC: 4 G/DL — SIGNIFICANT CHANGE UP (ref 3.3–5)
ALP SERPL-CCNC: 67 U/L — SIGNIFICANT CHANGE UP (ref 40–120)
ALT FLD-CCNC: 11 U/L — SIGNIFICANT CHANGE UP (ref 4–33)
AST SERPL-CCNC: 19 U/L — SIGNIFICANT CHANGE UP (ref 4–32)
BASOPHILS # BLD AUTO: 0.03 K/UL — SIGNIFICANT CHANGE UP (ref 0–0.2)
BASOPHILS NFR BLD AUTO: 0.2 % — SIGNIFICANT CHANGE UP (ref 0–2)
BILIRUB SERPL-MCNC: 0.3 MG/DL — SIGNIFICANT CHANGE UP (ref 0.2–1.2)
BUN SERPL-MCNC: 28 MG/DL — HIGH (ref 7–23)
CALCIUM SERPL-MCNC: 9.6 MG/DL — SIGNIFICANT CHANGE UP (ref 8.4–10.5)
CHLORIDE SERPL-SCNC: 106 MMOL/L — SIGNIFICANT CHANGE UP (ref 98–107)
CO2 SERPL-SCNC: 25 MMOL/L — SIGNIFICANT CHANGE UP (ref 22–31)
CREAT SERPL-MCNC: 1.11 MG/DL — SIGNIFICANT CHANGE UP (ref 0.5–1.3)
EOSINOPHIL # BLD AUTO: 0.05 K/UL — SIGNIFICANT CHANGE UP (ref 0–0.5)
EOSINOPHIL NFR BLD AUTO: 0.4 % — SIGNIFICANT CHANGE UP (ref 0–6)
GLUCOSE SERPL-MCNC: 98 MG/DL — SIGNIFICANT CHANGE UP (ref 70–99)
HCT VFR BLD CALC: 30.1 % — LOW (ref 34.5–45)
HGB BLD-MCNC: 10.2 G/DL — LOW (ref 11.5–15.5)
IMM GRANULOCYTES # BLD AUTO: 0.04 # — SIGNIFICANT CHANGE UP
IMM GRANULOCYTES NFR BLD AUTO: 0.3 % — SIGNIFICANT CHANGE UP (ref 0–1.5)
LYMPHOCYTES # BLD AUTO: 18.7 % — SIGNIFICANT CHANGE UP (ref 13–44)
LYMPHOCYTES # BLD AUTO: 2.3 K/UL — SIGNIFICANT CHANGE UP (ref 1–3.3)
MAGNESIUM SERPL-MCNC: 2.3 MG/DL — SIGNIFICANT CHANGE UP (ref 1.6–2.6)
MCHC RBC-ENTMCNC: 30.9 PG — SIGNIFICANT CHANGE UP (ref 27–34)
MCHC RBC-ENTMCNC: 33.9 % — SIGNIFICANT CHANGE UP (ref 32–36)
MCV RBC AUTO: 91.2 FL — SIGNIFICANT CHANGE UP (ref 80–100)
MONOCYTES # BLD AUTO: 0.85 K/UL — SIGNIFICANT CHANGE UP (ref 0–0.9)
MONOCYTES NFR BLD AUTO: 6.9 % — SIGNIFICANT CHANGE UP (ref 2–14)
NEUTROPHILS # BLD AUTO: 9.01 K/UL — HIGH (ref 1.8–7.4)
NEUTROPHILS NFR BLD AUTO: 73.5 % — SIGNIFICANT CHANGE UP (ref 43–77)
NRBC # FLD: 0 — SIGNIFICANT CHANGE UP
PHOSPHATE SERPL-MCNC: 4.7 MG/DL — HIGH (ref 2.5–4.5)
PLATELET # BLD AUTO: 237 K/UL — SIGNIFICANT CHANGE UP (ref 150–400)
PMV BLD: 11 FL — SIGNIFICANT CHANGE UP (ref 7–13)
POTASSIUM SERPL-MCNC: 4.8 MMOL/L — SIGNIFICANT CHANGE UP (ref 3.5–5.3)
POTASSIUM SERPL-SCNC: 4.8 MMOL/L — SIGNIFICANT CHANGE UP (ref 3.5–5.3)
PROT SERPL-MCNC: 7.2 G/DL — SIGNIFICANT CHANGE UP (ref 6–8.3)
RBC # BLD: 3.3 M/UL — LOW (ref 3.8–5.2)
RBC # FLD: 14.1 % — SIGNIFICANT CHANGE UP (ref 10.3–14.5)
SODIUM SERPL-SCNC: 142 MMOL/L — SIGNIFICANT CHANGE UP (ref 135–145)
TSH SERPL-MCNC: 0.62 UIU/ML — SIGNIFICANT CHANGE UP (ref 0.27–4.2)
WBC # BLD: 12.28 K/UL — HIGH (ref 3.8–10.5)
WBC # FLD AUTO: 12.28 K/UL — HIGH (ref 3.8–10.5)

## 2018-11-27 PROCEDURE — 76770 US EXAM ABDO BACK WALL COMP: CPT | Mod: 26

## 2018-11-27 RX ORDER — MONTELUKAST 4 MG/1
1 TABLET, CHEWABLE ORAL
Qty: 30 | Refills: 0 | OUTPATIENT
Start: 2018-11-27 | End: 2018-12-26

## 2018-11-27 RX ORDER — BUDESONIDE AND FORMOTEROL FUMARATE DIHYDRATE 160; 4.5 UG/1; UG/1
2 AEROSOL RESPIRATORY (INHALATION)
Qty: 0 | Refills: 0 | Status: DISCONTINUED | OUTPATIENT
Start: 2018-11-27 | End: 2018-11-27

## 2018-11-27 RX ORDER — MONTELUKAST 4 MG/1
10 TABLET, CHEWABLE ORAL DAILY
Qty: 0 | Refills: 0 | Status: DISCONTINUED | OUTPATIENT
Start: 2018-11-27 | End: 2018-11-27

## 2018-11-27 RX ORDER — TIOTROPIUM BROMIDE 18 UG/1
1 CAPSULE ORAL; RESPIRATORY (INHALATION)
Qty: 1 | Refills: 0 | OUTPATIENT
Start: 2018-11-27 | End: 2018-12-26

## 2018-11-27 RX ORDER — LEVOTHYROXINE SODIUM 125 MCG
1 TABLET ORAL
Qty: 0 | Refills: 0 | COMMUNITY

## 2018-11-27 RX ADMIN — MONTELUKAST 10 MILLIGRAM(S): 4 TABLET, CHEWABLE ORAL at 12:07

## 2018-11-27 RX ADMIN — LOSARTAN POTASSIUM 50 MILLIGRAM(S): 100 TABLET, FILM COATED ORAL at 07:06

## 2018-11-27 RX ADMIN — AMLODIPINE BESYLATE 5 MILLIGRAM(S): 2.5 TABLET ORAL at 07:06

## 2018-11-27 RX ADMIN — PANTOPRAZOLE SODIUM 40 MILLIGRAM(S): 20 TABLET, DELAYED RELEASE ORAL at 07:06

## 2018-11-27 RX ADMIN — Medication 25 MICROGRAM(S): at 07:06

## 2018-11-27 RX ADMIN — Medication 3 MILLILITER(S): at 09:35

## 2018-11-27 RX ADMIN — Medication 50 MILLIGRAM(S): at 07:06

## 2018-11-27 RX ADMIN — Medication 1 TABLET(S): at 12:07

## 2018-11-27 RX ADMIN — Medication 3 MILLILITER(S): at 03:13

## 2018-11-27 RX ADMIN — Medication 81 MILLIGRAM(S): at 12:07

## 2018-11-27 NOTE — PROGRESS NOTE ADULT - SUBJECTIVE AND OBJECTIVE BOX
CHIEF COMPLAINT:Patient is a 84y old  Female who presents with a chief complaint of shortness of breath (27 Nov 2018 11:04)    	  Interval history: feels well, SOB and wheezing resolved      Allergies:  penicillin (Hives; Urticaria; Rash; Angioedema)  promethazine (Short breath)      PAST MEDICAL & SURGICAL HISTORY:  Asthma  Hypothyroidism  CVA (cerebral vascular accident)  Hypertension  HTN - Hypertension  High Cholesterol (ICD9 272.0)  Age-related incipient cataract of both eyes  H/O tubal ligation  Benign Essential Hypertension (ICD9 401.1)  Hyperlipidemia (ICD9 272.4)  Benign Essential Hypertension (ICD9 401.1)      FAMILY HISTORY:  Family history of essential hypertension (Mother)      REVIEW OF SYSTEMS:  CONSTITUTIONAL: No fever, weight loss, or fatigue  EYES: No eye pain, visual disturbances, or discharge  NECK: No pain or stiffness  RESPIRATORY: No cough or wheezing, no shortness of breath  CARDIOVASCULAR: No chest pain, palpitations, dizziness, or leg swelling  GASTROINTESTINAL: No abdominal or epigastric pain. No nausea, vomiting, diarrhea or constipation  GENITOURINARY: No dysuria, urinary frequency or urgency, no hematuria  NEUROLOGICAL: No headaches, memory loss, loss of strength, numbness, or tremors  SKIN: No itching, burning, rashes, or lesions   MUSCULOSKELETAL: No joint pain or swelling; No muscle, back, or extremity pain    Medications:  MEDICATIONS  (STANDING):  ALBUTerol    90 MICROgram(s) HFA Inhaler 1 Puff(s) Inhalation every 4 hours  ALBUTerol/ipratropium for Nebulization 3 milliLiter(s) Nebulizer every 6 hours  amLODIPine   Tablet 5 milliGRAM(s) Oral daily  aspirin enteric coated 81 milliGRAM(s) Oral daily  buDESOnide 160 MICROgram(s)/formoterol 4.5 MICROgram(s) Inhaler 2 Puff(s) Inhalation two times a day  enoxaparin Injectable 40 milliGRAM(s) SubCutaneous every 24 hours  levothyroxine 25 MICROGram(s) Oral daily  losartan 50 milliGRAM(s) Oral daily  montelukast 10 milliGRAM(s) Oral daily  multivitamin 1 Tablet(s) Oral daily  pantoprazole    Tablet 40 milliGRAM(s) Oral before breakfast  simvastatin 10 milliGRAM(s) Oral at bedtime  tiotropium 18 MICROgram(s) Capsule 1 Capsule(s) Inhalation daily    MEDICATIONS  (PRN):  acetaminophen    Suspension .. 650 milliGRAM(s) Oral every 6 hours PRN Moderate Pain (4 - 6)    	    PHYSICAL EXAM:  T(C): 36.6 (11-27-18 @ 10:25), Max: 36.7 (11-26-18 @ 15:51)  HR: 78 (11-27-18 @ 10:25) (56 - 91)  BP: 121/63 (11-27-18 @ 10:25) (121/63 - 145/65)  RR: 16 (11-27-18 @ 10:25) (16 - 18)  SpO2: 97% (11-27-18 @ 10:25) (96% - 100%)  Wt(kg): --  I&O's Summary      Appearance: Normal	  HEENT:   NCAT, PERRL, EOMI	  Lymphatic: No lymphadenopathy  Cardiovascular: Normal S1 S2, RRR  Respiratory: Lungs clear to auscultation BL  Psychiatry: A & O x 3, Mood & affect appropriate  Gastrointestinal:  Soft, Non-tender, + BS  Skin: No rashes, No ecchymoses, No cyanosis	  Neurologic: Non-focal  Extremities: Normal range of motion, No clubbing, cyanosis or edema    	  LABS:	 	    CARDIAC MARKERS:                                10.2   12.28 )-----------( 237      ( 27 Nov 2018 06:26 )             30.1     11-27    142  |  106  |  28<H>  ----------------------------<  98  4.8   |  25  |  1.11    Ca    9.6      27 Nov 2018 06:26  Phos  4.7     11-27  Mg     2.3     11-27    TPro  7.2  /  Alb  4.0  /  TBili  0.3  /  DBili  x   /  AST  19  /  ALT  11  /  AlkPhos  67  11-27    proBNP:   Lipid Profile:   HgA1c:   TSH: Thyroid Stimulating Hormone, Serum: 0.62 uIU/mL (11-27 @ 06:26)

## 2018-11-27 NOTE — PROGRESS NOTE ADULT - ASSESSMENT
84F with PMHx HTN, hypothyroid, Asthma, CVA presented with acute onset shortness of breath due to asthma exacerbation. Patient symptom improved with duoneb and solumedrol. Admit to medicine for monitoring of respiratory status. Incidental finding of Left renal mass on CT A/P.      Problem/Plan - 1:  ·  Problem: Mild intermittent asthma with exacerbation.  Plan: CXR and CT chest without evidence of pneumonia.  RVP negative  Pulm appreciated, c/w steroids taper, symbicort nebs per Pulm     Problem/Plan - 2:  ·  Problem: Essential hypertension.  Plan: BP in decent range  can c/w home dose losartan and norvasc.      Problem/Plan - 3:  ·  Problem: Acquired hypothyroidism.  Plan: check TSH  c/w synthroid.      Problem/Plan - 4:  ·  Problem: Cerebrovascular accident (CVA), unspecified mechanism.  Plan: h/o CVA? no focal deficit on exam  c/w ASA.      Problem/Plan - 5:  ·  Problem: Left renal mass.  Plan: incidental finding of 1.3cm Lt renal mass  will get US for further evaluation during hospitalization.      Problem/Plan - 6:  Problem: Prophylactic measure. Plan: DVT: Lovenox  Diet: Regular/DASH.    d/c planning

## 2018-11-27 NOTE — CONSULT NOTE ADULT - SUBJECTIVE AND OBJECTIVE BOX
Patient is a 84y old  Female who presents with a chief complaint of shortness of breath (26 Nov 2018 17:36)      HPI:  84F with PMHx HTN, Asthma, Hypothyroidism, OA presented with shortness of breath. Per daughter at bedside who provided the information, patient started having symptoms yesterday afternoon while going to the bathroom. She suddenly experienced shortness of breath and difficulty breathing, associated with coughing with white sputum at the time. Prior to yesterday, patient was in her normal state of health. Denied any fever, chill, coughing, SOB, chest pain prior to the event. At baseline, patient exertion is limited due to low back pain. uses albuterol 2-3 times per week and 1-2 times night time awakening. never been intubated. Patient was admitted 1 year prior for similar symptoms and had negative angiogram at the time.     In ED, patient was afebrile and hemodynamically stable. Patient was initially in respiratory distress, which improved with Duoneb and Solumedrol. Patient had CT chest/abd/Pelvis which were grossly unremarkable. Patient subsequently developed headache and dizziness, CT head was negative. Labs were grossly unremarkable except for mild leukocytosis. (26 Nov 2018 10:03)    per her daughter she has asthma and she is on Symbicort twice a day as well as albuterol prn: SHE IS DOING MUCH BETTER NOW: SHE HAS NO PULMONOLOGIST AS WELL AS WAS NEVER ADMITTED WITH ASTHMA EXACERBATION bEFORE:       ?FOLLOWING PRESENT  [ x] Hx of PE/DVT, [x ] Hx COPD, [y ] Hx of Asthma, [ x] Hx of Hospitalization, [x ]  Hx of BiPAP/CPAP use, [x ] Hx of JAMES    Allergies    penicillin (Hives; Urticaria; Rash; Angioedema)  promethazine (Short breath)    Intolerances        PAST MEDICAL & SURGICAL HISTORY:  Asthma  Hypothyroidism  CVA (cerebral vascular accident)  Hypertension  HTN - Hypertension  High Cholesterol (ICD9 272.0)  Age-related incipient cataract of both eyes  H/O tubal ligation  Benign Essential Hypertension (ICD9 401.1)  Hyperlipidemia (ICD9 272.4)  Benign Essential Hypertension (ICD9 401.1)      FAMILY HISTORY:  Family history of essential hypertension (Mother)      Social History: [  x] TOBACCO                  [x  ] ETOH                                 x[  ] IVDA/DRUGS    REVIEW OF SYSTEMS      General:x	    Skin/Breast:x  	  Ophthalmologic:x  	  ENMT:	x    Respiratory and Thorax: sob, wheezing  	  Cardiovascular:	x    Gastrointestinal:	x    Genitourinary:	x    Musculoskeletal:	x    Neurological:	x    Psychiatric:	x    Hematology/Lymphatics:	x    Endocrine:	x    Allergic/Immunologic:	x    MEDICATIONS  (STANDING):  ALBUTerol    90 MICROgram(s) HFA Inhaler 1 Puff(s) Inhalation every 4 hours  ALBUTerol/ipratropium for Nebulization 3 milliLiter(s) Nebulizer every 6 hours  amLODIPine   Tablet 5 milliGRAM(s) Oral daily  aspirin enteric coated 81 milliGRAM(s) Oral daily  enoxaparin Injectable 40 milliGRAM(s) SubCutaneous every 24 hours  levothyroxine 25 MICROGram(s) Oral daily  losartan 50 milliGRAM(s) Oral daily  multivitamin 1 Tablet(s) Oral daily  pantoprazole    Tablet 40 milliGRAM(s) Oral before breakfast  predniSONE   Tablet 50 milliGRAM(s) Oral daily  simvastatin 10 milliGRAM(s) Oral at bedtime  tiotropium 18 MICROgram(s) Capsule 1 Capsule(s) Inhalation daily    MEDICATIONS  (PRN):  acetaminophen    Suspension .. 650 milliGRAM(s) Oral every 6 hours PRN Moderate Pain (4 - 6)       Vital Signs Last 24 Hrs  T(C): 36.6 (27 Nov 2018 10:25), Max: 36.7 (26 Nov 2018 15:51)  T(F): 97.8 (27 Nov 2018 10:25), Max: 98.1 (26 Nov 2018 20:53)  HR: 78 (27 Nov 2018 10:25) (56 - 91)  BP: 121/63 (27 Nov 2018 10:25) (121/63 - 145/65)  BP(mean): --  RR: 16 (27 Nov 2018 10:25) (16 - 18)  SpO2: 97% (27 Nov 2018 10:25) (96% - 100%)        I&O's Summary      Physical Exam:   GENERAL: NAD, well-groomed, well-developed  HEENT: MOHSEN/   Atraumatic, Normocephalic  ENMT: No tonsillar erythema, exudates, or enlargement; Moist mucous membranes, Good dentition, No lesions  NECK: Supple, No JVD, Normal thyroid  CHEST/LUNG: Clear to auscultation bilaterally; No rales, rhonchi, wheezing, or rubs  CVS: Regular rate and rhythm; No murmurs, rubs, or gallops  GI: : Soft, Nontender, Nondistended; Bowel sounds present  NERVOUS SYSTEM:  Alert & Oriented X3  EXTREMITIES:  2+ Peripheral Pulses, No clubbing, cyanosis, or edema: no calf tenderness  LYMPH: No lymphadenopathy noted  SKIN: No rashes or lesions  ENDOCRINOLOGY: No Thyromegaly  PSYCH: Appropriate    Labs:  -0.7<48<4>>< 24<<7.335>>-0.7<<3><<4><<5<<< 249>>                            10.2   12.28 )-----------( 237      ( 27 Nov 2018 06:26 )             30.1                         11.7   13.23 )-----------( 243      ( 25 Nov 2018 18:30 )             35.2     11-27    142  |  106  |  28<H>  ----------------------------<  98  4.8   |  25  |  1.11  11-25    140  |  105  |  16  ----------------------------<  130<H>  5.3   |  18<L>  |  0.95    Ca    9.6      27 Nov 2018 06:26  Ca    10.1      25 Nov 2018 18:10  Phos  4.7     11-27  Mg     2.3     11-27    TPro  7.2  /  Alb  4.0  /  TBili  0.3  /  DBili  x   /  AST  19  /  ALT  11  /  AlkPhos  67  11-27  TPro  8.2  /  Alb  4.4  /  TBili  0.3  /  DBili  x   /  AST  43<H>  /  ALT  19  /  AlkPhos  66  11-25    CAPILLARY BLOOD GLUCOSE      < from: CT Chest w/ IV Cont (11.25.18 @ 21:48) >  ABDOMINAL WALL: Tiny fat-containing umbilical hernia.  BONES: Degenerative changes. Rate 1 anterolisthesis of L4 on L5.    IMPRESSION:   No focal pulmonary consolidations.    No acute intra-abdominal pathology.    Indeterminate 1.3 cm exophytic left renal lesion may represent a complex   cyst in the confirmed by a nonemergent renal ultrasound.              MAREK BELL M.D., RADIOLOGY RESIDENT  This document has been electronically signed.  ELIAS ZAMBRANO M.D., ATTENDING RADIOLOGIST  This document has been electronically signed. Nov 25 2018 11:01PM    < end of copied text >    LIVER FUNCTIONS - ( 27 Nov 2018 06:26 )  Alb: 4.0 g/dL / Pro: 7.2 g/dL / ALK PHOS: 67 u/L / ALT: 11 u/L / AST: 19 u/L / GGT: x               D DImer      Studies  Chest X-RAY  CT SCAN Chest   CT Abdomen  Venous Dopplers: LE:   Others

## 2019-05-24 PROBLEM — J45.909 UNSPECIFIED ASTHMA, UNCOMPLICATED: Chronic | Status: ACTIVE | Noted: 2018-11-25

## 2019-06-10 ENCOUNTER — APPOINTMENT (OUTPATIENT)
Dept: ORTHOPEDIC SURGERY | Facility: CLINIC | Age: 84
End: 2019-06-10

## 2019-06-17 ENCOUNTER — APPOINTMENT (OUTPATIENT)
Dept: ORTHOPEDIC SURGERY | Facility: CLINIC | Age: 84
End: 2019-06-17
Payer: MEDICAID

## 2019-06-17 PROCEDURE — 99214 OFFICE O/P EST MOD 30 MIN: CPT | Mod: 25

## 2019-06-17 PROCEDURE — 20611 DRAIN/INJ JOINT/BURSA W/US: CPT | Mod: 50

## 2019-06-17 PROCEDURE — 73564 X-RAY EXAM KNEE 4 OR MORE: CPT | Mod: 50

## 2019-06-17 NOTE — HISTORY OF PRESENT ILLNESS
[de-identified] : CC bilateral knee\par \par HPI 83 yo female right HD presents for CSI and PT FU of chronic onset of liters of constant pain in the bilateral knee without injury. The pain is worse, and rated a 8 out of 10, described as throbbing, with radiation back to the knee. NSAIDs makes the pain better and walking makes the pain worse. The patient reports associated symptoms of pop clinic. The patient - pain at night affecting sleep, and + similar pain previously.\par \par The patient has tried the following treatments:\par Activity modification	+\par Ice/Compression  	+\par Braces    		-\par Nsaids    		+\par Physical Therapy 	+ minimal improvement last round\par Cortisone Injection	+ 3-4 month improvement\par Visco Injection		- not covered\par Arthroscopy		-\par \par Review of Systems is positive for the above musculoskeletal symptoms and is otherwise non-contributory for general, constitutional, psychiatric, neurologic, HEENT, cardiac, respiratory, gastrointestinal, reproductive, lymphatic, and dermatologic complaints.\par \par Consult by Dr Cesar\par \par

## 2019-06-17 NOTE — DISCUSSION/SUMMARY
[de-identified] : Bilateral knee osteoarthritis right greater than left\par \par \par The patient and I discussed the causes and progression of degenerative joint disease of the knee. Models, diagrams and drawings were used in the discussion. Treatment can include conservative non-operative management and surgical options. Conservative management includes weight loss, activity modification, physical therapy to improve motion and strength in the muscles around the knee and the body's core, PO and topical NSAIDs, corticosteroid and/or viscosupplementation intra-articular injections. If the patient fails to improve with non-operative management, surgical management is possible. Depending upon the patient's age, BMI, activity level, degree and location of arthrosis different surgical options are possible including arthroscopic debridement with chondroplasty, high-tibial osteotomy, unicondylar/partial arthroplasty, and total joint arthroplasty.\par \par Procedure Note:\par \par Verbal consent was obtained for an arthrocentesis and intra-articular corticosteroid injection of the LEFT and RIGHT knee, after the risks and benefits were discussed with the patient. Potential adverse effects were discussed including but not limited to bleeding, skin/joint infection, local skin reactions including bleaching, bruising, stiffness, soreness, vasovagal episodes, transient hyperglycemia, avascular necrosis, pseudo-septic type reactions, post injection joint pain, allergic reaction to product or anesthetic and other rare but potential adverse effects along with benefits including decreased pain and improved stability prior to obtaining verbal informed consent. It was also discussed that for some patients the treatment is ineffective and there are no guarantees that the patient will experience improvement as the result of the injection. In rare occasions the injection can cause worsening of pain.\par \par The use of a Sonosite 15-6 MHz linear transducer with live ultrasound guidance of the knee was necessary given the patient's BMI and local body habitus overlying and obscuring the accurate identification of normal body bony anatomy used to identify the injection site and the depth of soft tissue envelope necessitating a longer than normal needle to reach the joint space, and to confirm the location of the needle tip and intra-articular delivery of the medication. Without the use of live ultrasound guidance the injection would have been more difficult and place the patient's neurovascular structures at risk from the longer needle needed to traverse the soft tissue envelope.\par \par A 6 inch bolster was placed underneath the BILATERAL knee to place the knee in a slightly flexed position.\par \par The LEFT knee superolateral injection site was identified using the ultrasound probe to identify the suprapatellar space and superior boarder of the patella first longitudinally and then transversely. The injection site was marked and prepped with a ChloraPrep swab and anesthetized with ethylchloride skin anesthesia. Using sterile technique a 20g 3 1/2 in spinal needle with 3 cc total of 1cc 1% lidocaine without epinephrine, 1cc 0.25% Marcaine without epinephrine and 1cc of 40mg/mL Kenalog was passed through the injection site towards the suprapatellar space under live ultrasound guidance and noted to penetrate the joint capsule. The medication was injected without resistance under live ultrasound visualization and noted to flow into the suprapatellar joint space. The injection site was sterilely dressed, there was minimal blood loss.\par \par The RIGHT knee superolateral injection site was identified using the ultrasound probe to identify the suprapatellar space and superior boarder of the patella first longitudinally and then transversely. The injection site was marked and prepped with a ChloraPrep swab and anesthetized with ethylchloride skin anesthesia. Using sterile technique a 20g 3 1/2 in spinal needle with 3 cc total of 1cc 1% lidocaine without epinephrine, 1cc 0.25% Marcaine without epinephrine and 1cc of 40mg/mL Kenalog was passed through the injection site towards the suprapatellar space under live ultrasound guidance and noted to penetrate the joint capsule. The medication was injected without resistance under live ultrasound visualization and noted to flow into the suprapatellar joint space. The injection site was sterilely dressed, there was minimal blood loss.\par \par The patient tolerated this procedure without any complications done by myself. Images were recorded and saved.\par \par The patient has been advised that if they notice any worsening of symptoms or any problems to contact me and seek care from a qualified medical professional. The patient was instructed to ice the knee and take NSAID medication on an as needed basis if the patient feels discomfort.\par \par Due to failure of prior non-operative modalities of treatment including physical therapy, activity modification, non-steroidal anti-inflammatory medications, and weight-loss with failure of multiple prior corticosteroid injections without appropriate improvement in symptoms and concern for too many frequent injections causing increased risk for adverse events, I am ordering a zilretta injection her bilateral knees and will obtain insurance authorization. The patient will be contacted by our authorization department over its status, copayment and when available for injection.\par \par The patient verifies their understanding the the visit, diagnosis and plan. They agree with the treatment plan and will contact the office with any questions or problems.\par Follow up\par PRN

## 2019-06-17 NOTE — PHYSICAL EXAM
[de-identified] : Physical Examination\par General: well nourished, in no acute distress, alert and oriented to person, place and time\par Psychiatric: normal mood and affect, no abnormal movements or speech patterns\par Eyes: vision intact without glasses\par Throat: no thyromegaly\par Lymph: no enlarged nodes, no lymphedema in extremity\par Respiratory: no wheezing, no shortness of breath with ambulation\par Cardiac: no cardiac leg swelling, 2+ peripheral pulses\par Neurology: normal gross sensation in extremities to light touch\par Abdomen: soft, non-tender, tympanic, no masses\par \par Musculoskeletal Examination\par Ambulation	+ antalgic gait, + assistive devices\par \par Knee			Right			Left\par General\par      Swelling/Deformity	normal			normal	\par      Skin			normal			normal\par      Erythema		-			-\par      Standing Alignment	valgus			neutral\par      Effusion		trace			trace\par Range of Motion\par      Hip			full painless ROM		full painless ROM\par      Knee Flexion		100			100\par      Knee Extension	0			0\par Patella\par      J Sign		-			-\par      Quad Medial/Lateral	1/1 1/1\par      Apprehension		-			-\par      Uday's		+			+\par      Grind Sign		+			+\par      Crepitus		+			+\par Palpation\par      Medial Joint Line	+			+\par      Medial Fem Condyle	-			-\par      Lateral Joint Line	++			-\par      Quad Tendon		-			-\par      Patella Tendon	-			-\par      Medial Patella		-			-\par      Lateral Patella 	-			-\par      Posterior Knee	+			+\par Ligamentous\par      Varus @ 0° / 30°	-/-			-/-\par      Valgus @ 0° / 30°	-/-			-/-\par      Lachman		-			-\par      Pivot Shift		-			-\par      Anterior Drawer	-			-\par      Posterior Drawer	-			-\par Meniscus\par      Kellie		-			-\par      Flexion Pinch		-			-\par Strength Examination/Atrophy\par      Hip Flexors 		5+			5+\par      Quadriceps		5+			5+\par      Hamstring		5+			5+\par      Tibialis Anterior	5+			5+\par      Achilles/Soleus	5+			5+\par Sensation\par      Deep Peroneal	normal			normal\par      Superficial Peroneal 	normal			normal\par      Sural  		normal			normal\par      Posterior Tibial 	normal			normal\par      Saphneous 		normal			normal\par Pulses\par      DP			2+			2+\par  [de-identified] : 5 views of the affected bilateral knee (standing AP, flexing standing AP, 30degree flexed lateral, 0degree lateral, sunrise view) \par 11-\par demonstrate:\par \par RIGHT\par There is severe lateral weightbearing asymmetric narrowing\par Small osteophytic lipping\par Trace suprapatellar effusion\par Mild-to-moderate patellofemoral joint space loss without evidence of tilt or subluxation on sunrise view\par Normal soft tissue density\par Otherwise normal osseous bone structure without fracture or dislocation\par \par LEFT\par There is mild narrowing\par Trace to small osteophytic lipping\par Trace suprapatellar effusion\par Mild to moderate patellofemoral joint space loss without evidence of tilt or subluxation on sunrise view\par Normal soft tissue density\par Otherwise normal osseous bone structure without fracture or dislocation\par \par 5 views of the affected bilateral knee (standing AP, flexing standing AP, 30degree flexed lateral, 0degree lateral, sunrise view) \par were ordered, obtained and evaluated by myself today and \par demonstrate:\par \par RIGHT\par There is severe lateral weightbearing asymmetric narrowing\par Small osteophytic lipping\par Trace suprapatellar effusion\par Mild-to-moderate patellofemoral joint space loss without evidence of tilt or subluxation on sunrise view\par Normal soft tissue density\par Otherwise normal osseous bone structure without fracture or dislocation\par \par LEFT\par There is mild narrowing\par Trace to small osteophytic lipping\par Trace suprapatellar effusion\par Mild to moderate patellofemoral joint space loss without evidence of tilt or subluxation on sunrise view\par Normal soft tissue density\par Otherwise normal osseous bone structure without fracture or dislocation

## 2019-08-13 ENCOUNTER — APPOINTMENT (OUTPATIENT)
Dept: ORTHOPEDIC SURGERY | Facility: CLINIC | Age: 84
End: 2019-08-13
Payer: MEDICAID

## 2019-08-13 DIAGNOSIS — M17.0 BILATERAL PRIMARY OSTEOARTHRITIS OF KNEE: ICD-10-CM

## 2019-08-13 PROCEDURE — 20611 DRAIN/INJ JOINT/BURSA W/US: CPT | Mod: LT

## 2019-08-13 PROCEDURE — 99213 OFFICE O/P EST LOW 20 MIN: CPT | Mod: 25

## 2019-08-13 NOTE — PHYSICAL EXAM
[de-identified] : Physical Examination\par General: well nourished, in no acute distress, alert and oriented to person, place and time\par Psychiatric: normal mood and affect, no abnormal movements or speech patterns\par Eyes: vision intact without glasses\par Throat: no thyromegaly\par Lymph: no enlarged nodes, no lymphedema in extremity\par Respiratory: no wheezing, no shortness of breath with ambulation\par Cardiac: no cardiac leg swelling, 2+ peripheral pulses\par Neurology: normal gross sensation in extremities to light touch\par Abdomen: soft, non-tender, tympanic, no masses\par \par Musculoskeletal Examination\par Ambulation	+ antalgic gait, + assistive devices\par \par Knee			Right			Left\par General\par      Swelling/Deformity	normal			normal	\par      Skin			normal			normal\par      Erythema		-			-\par      Standing Alignment	valgus			neutral\par      Effusion		trace			trace\par Range of Motion\par      Hip			full painless ROM		full painless ROM\par      Knee Flexion		90			100\par      Knee Extension	0			0\par Patella\par      J Sign		-			-\par      Quad Medial/Lateral	1/1 1/1\par      Apprehension		-			-\par      Uday's		+			+\par      Grind Sign		+			+\par      Crepitus		+			+\par Palpation\par      Medial Joint Line	+			+\par      Medial Fem Condyle	-			-\par      Lateral Joint Line	++			-\par      Quad Tendon		-			-\par      Patella Tendon	-			-\par      Medial Patella		-			-\par      Lateral Patella 	-			-\par      Posterior Knee	+			+\par Ligamentous\par      Varus @ 0° / 30°	-/-			-/-\par      Valgus @ 0° / 30°	-/-			-/-\par \par Strength Examination/Atrophy\par      Hip Flexors 		5+			5+\par      Quadriceps		5+			5+\par      Hamstring		5+			5+\par      Tibialis Anterior	5+			5+\par      Achilles/Soleus	5+			5+\par Sensation\par      Deep Peroneal	normal			normal\par      Superficial Peroneal 	normal			normal\par      Sural  		normal			normal\par      Posterior Tibial 	normal			normal\par      Saphneous 		normal			normal\par Pulses\par      DP			2+			2+\par  [de-identified] : 5 views of the affected bilateral knee (standing AP, flexing standing AP, 30degree flexed lateral, 0degree lateral, sunrise view) \par 11-\par demonstrate:\par \par RIGHT\par There is severe lateral weightbearing asymmetric narrowing\par Small osteophytic lipping\par Trace suprapatellar effusion\par Mild-to-moderate patellofemoral joint space loss without evidence of tilt or subluxation on sunrise view\par Normal soft tissue density\par Otherwise normal osseous bone structure without fracture or dislocation\par \par LEFT\par There is mild narrowing\par Trace to small osteophytic lipping\par Trace suprapatellar effusion\par Mild to moderate patellofemoral joint space loss without evidence of tilt or subluxation on sunrise view\par Normal soft tissue density\par Otherwise normal osseous bone structure without fracture or dislocation\par \par 5 views of the affected bilateral knee (standing AP, flexing standing AP, 30degree flexed lateral, 0degree lateral, sunrise view) \par 5-17-19\par demonstrate:\par \par RIGHT\par There is severe lateral weightbearing asymmetric narrowing\par Small osteophytic lipping\par Trace suprapatellar effusion\par Mild-to-moderate patellofemoral joint space loss without evidence of tilt or subluxation on sunrise view\par Normal soft tissue density\par Otherwise normal osseous bone structure without fracture or dislocation\par \par LEFT\par There is mild narrowing\par Trace to small osteophytic lipping\par Trace suprapatellar effusion\par Mild to moderate patellofemoral joint space loss without evidence of tilt or subluxation on sunrise view\par Normal soft tissue density\par Otherwise normal osseous bone structure without fracture or dislocation

## 2019-08-13 NOTE — HISTORY OF PRESENT ILLNESS
[de-identified] : CC bilateral knee\par \par HPI 83 yo female right HD presents for CSI and PT FU of chronic onset of liters of constant pain in the bilateral knee without injury. The pain is worse, and rated a 8 out of 10, described as throbbing, with radiation back to the knee. NSAIDs makes the pain better and walking makes the pain worse. The patient reports associated symptoms of pop clinic. The patient - pain at night affecting sleep, and + similar pain previously.\par \par The patient has tried the following treatments:\par Activity modification	+\par Ice/Compression  	+\par Braces    		-\par Nsaids    		+\par Physical Therapy 	+ minimal improvement last round\par Cortisone Injection	+ 3-4 month improvement 1st, now only 1-2 months second\par Visco Injection		- not covered\par Arthroscopy		-\par \par Zilretta refusal by insurance.\par \par Review of Systems is positive for the above musculoskeletal symptoms and is otherwise non-contributory for general, constitutional, psychiatric, neurologic, HEENT, cardiac, respiratory, gastrointestinal, reproductive, lymphatic, and dermatologic complaints.\par \par Consult by Dr Cesar\par \par

## 2019-08-13 NOTE — DISCUSSION/SUMMARY
[de-identified] : Bilateral knee osteoarthritis right greater than left\par \par \par The patient and I discussed the causes and progression of degenerative joint disease of the knee. Models, diagrams and drawings were used in the discussion. Treatment can include conservative non-operative management and surgical options. Conservative management includes weight loss, activity modification, physical therapy to improve motion and strength in the muscles around the knee and the body's core, PO and topical NSAIDs, corticosteroid and/or viscosupplementation intra-articular injections. If the patient fails to improve with non-operative management, surgical management is possible. Depending upon the patient's age, BMI, activity level, degree and location of arthrosis different surgical options are possible including arthroscopic debridement with chondroplasty, high-tibial osteotomy, unicondylar/partial arthroplasty, and total joint arthroplasty.\par \par Procedure Note:\par \par Verbal consent was obtained for an arthrocentesis and intra-articular ketorolac and corticosteroid injection of the LEFT and RIGHT knee, after the risks and benefits were discussed with the patient. Potential adverse effects were discussed including but not limited to bleeding, skin/joint infection, local skin reactions including bleaching, bruising, stiffness, soreness, vasovagal episodes, transient hyperglycemia, avascular necrosis, pseudo-septic type reactions, post injection joint pain, allergic reaction to product or anesthetic and other rare but potential adverse effects along with benefits including decreased pain and improved stability prior to obtaining verbal informed consent. It was also discussed that for some patients the treatment is ineffective and there are no guarantees that the patient will experience improvement as the result of the injection. In rare occasions the injection can cause worsening of pain.\par \par The use of a Sonosite 15-6 MHz linear transducer with live ultrasound guidance of the knee was necessary given the patient's BMI and local body habitus overlying and obscuring the accurate identification of normal body bony anatomy used to identify the injection site and the depth of soft tissue envelope necessitating a longer than normal needle to reach the joint space, and to confirm the location of the needle tip and intra-articular delivery of the medication. Without the use of live ultrasound guidance the injection would have been more difficult and place the patient's neurovascular structures at risk from the longer needle needed to traverse the soft tissue envelope.\par \par will proceed w CSI periodically, risks of too frequent injections discussed. patient doesn't want surgery at this time\par \par A 6 inch bolster was placed underneath the BILATERAL knee to place the knee in a slightly flexed position.\par \par The LEFT knee superolateral injection site was identified using the ultrasound probe to identify the suprapatellar space and superior boarder of the patella first longitudinally and then transversely. The injection site was marked and prepped with a ChloraPrep swab and anesthetized with ethylchloride skin anesthesia. Using sterile technique a 20g 3 1/2 in spinal needle with 5 cc total of 1cc 1% lidocaine without epinephrine, 1cc 0.25% Marcaine without epinephrine, 2cc Ketorolac 30mg/mL and 1cc of 40mg/mL Kenalog was passed through the injection site towards the suprapatellar space under live ultrasound guidance and noted to penetrate the joint capsule. The medication was injected without resistance under live ultrasound visualization and noted to flow into the suprapatellar joint space. The injection site was sterilely dressed, there was minimal blood loss.\par \par The RIGHT knee superolateral injection site was identified using the ultrasound probe to identify the suprapatellar space and superior boarder of the patella first longitudinally and then transversely. The injection site was marked and prepped with a ChloraPrep swab and anesthetized with ethylchloride skin anesthesia. Using sterile technique a 20g 3 1/2 in spinal needle with 5 cc total of 1cc 1% lidocaine without epinephrine, 1cc 0.25% Marcaine without epinephrine, 2cc Ketorolac 30mg/mL and 1cc of 40mg/mL Kenalog was passed through the injection site towards the suprapatellar space under live ultrasound guidance and noted to penetrate the joint capsule. The medication was injected without resistance under live ultrasound visualization and noted to flow into the suprapatellar joint space. The injection site was sterilely dressed, there was minimal blood loss.\par \par The patient tolerated this procedure without any complications done by myself. Images were recorded and saved.\par \par \par The patient verifies their understanding the the visit, diagnosis and plan. They agree with the treatment plan and will contact the office with any questions or problems.\par Follow up\par PRN

## 2020-05-15 NOTE — CONSULT NOTE ADULT - PROBLEM SELECTOR PROBLEM 5
Will send Lipitor 20 mg nightly ([facility] recommendation per previous encounter). Labors ordered to be checked in 8 weeks. Marquita Dowell knows to call us with questions/concerns.
Left renal mass

## 2020-08-18 NOTE — ED ADULT NURSE NOTE - FALL HARM RISK CONCLUSION
Detail Level: Detailed Size Of Lesion: 6x4mm hazy light brown macule Size Of Lesion: 7x3mm lbm Size Of Lesion: 4x3mm mbm Size Of Lesion: 7x4mm mbm Size Of Lesion: 5x3.5mm mbm Universal Safety Interventions

## 2020-08-27 NOTE — PHYSICAL THERAPY INITIAL EVALUATION ADULT - PERTINENT HX OF CURRENT PROBLEM, REHAB EVAL
presents with constant crampy squeezing chest pain which radiates to the back associated with chest pain since Friday but worsened this AM. Still endorses chest pain with palpation of chest wall. States that it is in the epigastric region with radiation to R shoulder, pain worse with movement, eating & taking a deep breath. s/p abnormal stress test 11/7, s/p card cath 11/9: mild, non-obstructive, coronary disease.  cont...
nic

## 2020-12-08 NOTE — ED PROVIDER NOTE - NS ED MD DISPO ADMIT [facility]
MEDICINE Bactrim Pregnancy And Lactation Text: This medication is Pregnancy Category D and is known to cause fetal risk.  It is also excreted in breast milk.

## 2021-01-19 NOTE — PHYSICAL THERAPY INITIAL EVALUATION ADULT - PATIENT/FAMILY AGREES WITH PLAN
yes Azathioprine Counseling:  I discussed with the patient the risks of azathioprine including but not limited to myelosuppression, immunosuppression, hepatotoxicity, lymphoma, and infections.  The patient understands that monitoring is required including baseline LFTs, Creatinine, possible TPMP genotyping and weekly CBCs for the first month and then every 2 weeks thereafter.  The patient verbalized understanding of the proper use and possible adverse effects of azathioprine.  All of the patient's questions and concerns were addressed.

## 2022-07-23 NOTE — H&P ADULT - PROBLEM/PLAN-4
DISPLAY PLAN FREE TEXT
no back pain, no gout, no musculoskeletal pain, no neck pain, and no weakness.

## 2022-10-01 ENCOUNTER — EMERGENCY (EMERGENCY)
Facility: HOSPITAL | Age: 87
LOS: 1 days | Discharge: HOME | End: 2022-10-01
Attending: EMERGENCY MEDICINE | Admitting: EMERGENCY MEDICINE

## 2022-10-01 VITALS
RESPIRATION RATE: 20 BRPM | HEIGHT: 63 IN | OXYGEN SATURATION: 100 % | TEMPERATURE: 98 F | HEART RATE: 76 BPM | SYSTOLIC BLOOD PRESSURE: 162 MMHG | DIASTOLIC BLOOD PRESSURE: 69 MMHG

## 2022-10-01 VITALS
OXYGEN SATURATION: 100 % | HEART RATE: 56 BPM | RESPIRATION RATE: 20 BRPM | SYSTOLIC BLOOD PRESSURE: 135 MMHG | DIASTOLIC BLOOD PRESSURE: 55 MMHG

## 2022-10-01 DIAGNOSIS — Z98.51 TUBAL LIGATION STATUS: Chronic | ICD-10-CM

## 2022-10-01 DIAGNOSIS — H25.093 OTHER AGE-RELATED INCIPIENT CATARACT, BILATERAL: Chronic | ICD-10-CM

## 2022-10-01 LAB
ALBUMIN SERPL ELPH-MCNC: 4.6 G/DL — SIGNIFICANT CHANGE UP (ref 3.3–5)
ALP SERPL-CCNC: 72 U/L — SIGNIFICANT CHANGE UP (ref 40–120)
ALT FLD-CCNC: 17 U/L — SIGNIFICANT CHANGE UP (ref 4–33)
ANION GAP SERPL CALC-SCNC: 12 MMOL/L — SIGNIFICANT CHANGE UP (ref 7–14)
AST SERPL-CCNC: 36 U/L — HIGH (ref 4–32)
B PERT DNA SPEC QL NAA+PROBE: SIGNIFICANT CHANGE UP
B PERT+PARAPERT DNA PNL SPEC NAA+PROBE: SIGNIFICANT CHANGE UP
BASOPHILS # BLD AUTO: 0.03 K/UL — SIGNIFICANT CHANGE UP (ref 0–0.2)
BASOPHILS NFR BLD AUTO: 0.4 % — SIGNIFICANT CHANGE UP (ref 0–2)
BILIRUB SERPL-MCNC: 0.3 MG/DL — SIGNIFICANT CHANGE UP (ref 0.2–1.2)
BORDETELLA PARAPERTUSSIS (RAPRVP): SIGNIFICANT CHANGE UP
BUN SERPL-MCNC: 18 MG/DL — SIGNIFICANT CHANGE UP (ref 7–23)
C PNEUM DNA SPEC QL NAA+PROBE: SIGNIFICANT CHANGE UP
CALCIUM SERPL-MCNC: 9.7 MG/DL — SIGNIFICANT CHANGE UP (ref 8.4–10.5)
CHLORIDE SERPL-SCNC: 104 MMOL/L — SIGNIFICANT CHANGE UP (ref 98–107)
CO2 SERPL-SCNC: 23 MMOL/L — SIGNIFICANT CHANGE UP (ref 22–31)
CREAT SERPL-MCNC: 1.19 MG/DL — SIGNIFICANT CHANGE UP (ref 0.5–1.3)
EGFR: 44 ML/MIN/1.73M2 — LOW
EOSINOPHIL # BLD AUTO: 0.69 K/UL — HIGH (ref 0–0.5)
EOSINOPHIL NFR BLD AUTO: 9.4 % — HIGH (ref 0–6)
FLUAV SUBTYP SPEC NAA+PROBE: SIGNIFICANT CHANGE UP
FLUBV RNA SPEC QL NAA+PROBE: SIGNIFICANT CHANGE UP
GLUCOSE SERPL-MCNC: 101 MG/DL — HIGH (ref 70–99)
HADV DNA SPEC QL NAA+PROBE: SIGNIFICANT CHANGE UP
HCOV 229E RNA SPEC QL NAA+PROBE: SIGNIFICANT CHANGE UP
HCOV HKU1 RNA SPEC QL NAA+PROBE: SIGNIFICANT CHANGE UP
HCOV NL63 RNA SPEC QL NAA+PROBE: SIGNIFICANT CHANGE UP
HCOV OC43 RNA SPEC QL NAA+PROBE: SIGNIFICANT CHANGE UP
HCT VFR BLD CALC: 35.1 % — SIGNIFICANT CHANGE UP (ref 34.5–45)
HGB BLD-MCNC: 11.1 G/DL — LOW (ref 11.5–15.5)
HMPV RNA SPEC QL NAA+PROBE: SIGNIFICANT CHANGE UP
HPIV1 RNA SPEC QL NAA+PROBE: SIGNIFICANT CHANGE UP
HPIV2 RNA SPEC QL NAA+PROBE: SIGNIFICANT CHANGE UP
HPIV3 RNA SPEC QL NAA+PROBE: SIGNIFICANT CHANGE UP
HPIV4 RNA SPEC QL NAA+PROBE: SIGNIFICANT CHANGE UP
IANC: 3.89 K/UL — SIGNIFICANT CHANGE UP (ref 1.8–7.4)
IMM GRANULOCYTES NFR BLD AUTO: 0.3 % — SIGNIFICANT CHANGE UP (ref 0–0.9)
LYMPHOCYTES # BLD AUTO: 2.05 K/UL — SIGNIFICANT CHANGE UP (ref 1–3.3)
LYMPHOCYTES # BLD AUTO: 28 % — SIGNIFICANT CHANGE UP (ref 13–44)
M PNEUMO DNA SPEC QL NAA+PROBE: SIGNIFICANT CHANGE UP
MAGNESIUM SERPL-MCNC: 2.1 MG/DL — SIGNIFICANT CHANGE UP (ref 1.6–2.6)
MCHC RBC-ENTMCNC: 30 PG — SIGNIFICANT CHANGE UP (ref 27–34)
MCHC RBC-ENTMCNC: 31.6 GM/DL — LOW (ref 32–36)
MCV RBC AUTO: 94.9 FL — SIGNIFICANT CHANGE UP (ref 80–100)
MONOCYTES # BLD AUTO: 0.65 K/UL — SIGNIFICANT CHANGE UP (ref 0–0.9)
MONOCYTES NFR BLD AUTO: 8.9 % — SIGNIFICANT CHANGE UP (ref 2–14)
NEUTROPHILS # BLD AUTO: 3.89 K/UL — SIGNIFICANT CHANGE UP (ref 1.8–7.4)
NEUTROPHILS NFR BLD AUTO: 53 % — SIGNIFICANT CHANGE UP (ref 43–77)
NRBC # BLD: 0 /100 WBCS — SIGNIFICANT CHANGE UP (ref 0–0)
NRBC # FLD: 0 K/UL — SIGNIFICANT CHANGE UP (ref 0–0)
NT-PROBNP SERPL-SCNC: 59 PG/ML — SIGNIFICANT CHANGE UP
PLATELET # BLD AUTO: 309 K/UL — SIGNIFICANT CHANGE UP (ref 150–400)
POTASSIUM SERPL-MCNC: 5.2 MMOL/L — SIGNIFICANT CHANGE UP (ref 3.5–5.3)
POTASSIUM SERPL-SCNC: 5.2 MMOL/L — SIGNIFICANT CHANGE UP (ref 3.5–5.3)
PROT SERPL-MCNC: 8.1 G/DL — SIGNIFICANT CHANGE UP (ref 6–8.3)
RAPID RVP RESULT: SIGNIFICANT CHANGE UP
RBC # BLD: 3.7 M/UL — LOW (ref 3.8–5.2)
RBC # FLD: 13 % — SIGNIFICANT CHANGE UP (ref 10.3–14.5)
RSV RNA SPEC QL NAA+PROBE: SIGNIFICANT CHANGE UP
RV+EV RNA SPEC QL NAA+PROBE: SIGNIFICANT CHANGE UP
SARS-COV-2 RNA SPEC QL NAA+PROBE: SIGNIFICANT CHANGE UP
SODIUM SERPL-SCNC: 139 MMOL/L — SIGNIFICANT CHANGE UP (ref 135–145)
WBC # BLD: 7.33 K/UL — SIGNIFICANT CHANGE UP (ref 3.8–10.5)
WBC # FLD AUTO: 7.33 K/UL — SIGNIFICANT CHANGE UP (ref 3.8–10.5)

## 2022-10-01 PROCEDURE — 99285 EMERGENCY DEPT VISIT HI MDM: CPT

## 2022-10-01 PROCEDURE — 93010 ELECTROCARDIOGRAM REPORT: CPT

## 2022-10-01 PROCEDURE — 71046 X-RAY EXAM CHEST 2 VIEWS: CPT | Mod: 26

## 2022-10-01 RX ORDER — IPRATROPIUM/ALBUTEROL SULFATE 18-103MCG
3 AEROSOL WITH ADAPTER (GRAM) INHALATION ONCE
Refills: 0 | Status: COMPLETED | OUTPATIENT
Start: 2022-10-01 | End: 2022-10-01

## 2022-10-01 RX ADMIN — Medication 60 MILLIGRAM(S): at 17:56

## 2022-10-01 RX ADMIN — Medication 3 MILLILITER(S): at 19:08

## 2022-10-01 RX ADMIN — Medication 3 MILLILITER(S): at 17:40

## 2022-10-01 RX ADMIN — Medication 3 MILLILITER(S): at 17:57

## 2022-10-01 NOTE — ED PROVIDER NOTE - ATTENDING CONTRIBUTION TO CARE
The patient is a 88y Female who has a past medical and surgery history of HLD HTN CVA Hypothyroidism CVA Asthma tubal ligation PTED with pt sent in from urgent care for sob/ flu like symptoms fever x 2 days with cough, pt using nebs with minimal improvement. pmh: htn, asthma   Vital Signs Last 24 Hrs  T(F): 98.1 HR: 64 BP: 135/65RR: 20 SpO2: 100% (01 Oct 2022 16:26) (100% - 100%)  PE: as described; my additions and exceptions are noted in the chart    DATA:  EKG: pending at time of evaluation  LAB: See "pullset"  CXR negative   IMPRESSION/RISK:  Dx= Ashtma exacerbation Consideration include Low bnp negative chest XR  makes cardiac etiology less likely   Plan  Nebs   steroids   EKG   reassess   dispo as per results and response probable d/c

## 2022-10-01 NOTE — ED PROVIDER NOTE - PROGRESS NOTE DETAILS
Fabrice PGY2: spoke to daughter, pt feels improved, wheezing reduced. Pt and daughter likely want to go home assuming labs/CXR are within reasonable limits. Can f/u with Dr Harshal Brennan her pulm on Mon. Will plan to send steroids to Rx. labs unremarkable, CA home

## 2022-10-01 NOTE — ED ADULT NURSE NOTE - NSIMPLEMENTINTERV_GEN_ALL_ED
Implemented All Fall with Harm Risk Interventions:  [place] to call system. Call bell, personal items and telephone within reach. Instruct patient to call for assistance. Room bathroom lighting operational. Non-slip footwear when patient is off stretcher. Physically safe environment: no spills, clutter or unnecessary equipment. Stretcher in lowest position, wheels locked, appropriate side rails in place. Provide visual cue, wrist band, yellow gown, etc. Monitor gait and stability. Monitor for mental status changes and reorient to person, place, and time. Review medications for side effects contributing to fall risk. Reinforce activity limits and safety measures with patient and family. Provide visual clues: red socks.

## 2022-10-01 NOTE — ED PROVIDER NOTE - OBJECTIVE STATEMENT
89yo F with PMH of asthma, hypothyroidism, HTN, HLD, CVA presents to ED for eval of wheezing. For last ~3d has had inc wheezing, dry cough and SOB. Tried her albuterol inhaler w/o significant relief. +fevers @ home but No CP, abd pain, NVDC. Took her to  today who told her to come to ED for the wheezing/congestion.

## 2022-10-01 NOTE — ED PROVIDER NOTE - NSICDXPASTSURGICALHX_GEN_ALL_CORE_FT
PAST SURGICAL HISTORY:  Age-related incipient cataract of both eyes     Benign Essential Hypertension (ICD9 401.1)     Benign Essential Hypertension (ICD9 401.1)     H/O tubal ligation     Hyperlipidemia (ICD9 272.4)

## 2022-10-01 NOTE — ED PROVIDER NOTE - NSFOLLOWUPINSTRUCTIONS_ED_ALL_ED_FT
Asthma    WHAT YOU NEED TO KNOW:    Asthma is a lung disease that makes breathing difficult. Chronic inflammation and reactions to triggers narrow the airways in the lungs. Asthma can become life-threatening if it is not managed.    - Continue with your nebulizer up to every 4 hours at home as needed  - Take prednisone 40mg for the next 4 days starting 10/2.  - Follow up with your Pulmonologist Dr Brennan on discharge        DISCHARGE INSTRUCTIONS:    Call your local emergency number (911 in the [place]) if:   •You have severe shortness of breath.  •The skin around your neck and ribs pulls in with each breath.  •Your peak flow numbers are in the red zone of your AAP.    Return to the emergency department if:   •You have shortness of breath, even after you take your short-term medicine as directed.  •Your lips or nails turn blue or gray.    Call your doctor or asthma specialist if:   •You run out of medicine before your next refill is due.  •Your symptoms get worse.  •You need to take more medicine than usual to control your symptoms.  •You have questions or concerns about your condition or care.    Medicines:   •Medicines may be used to decrease inflammation, open airways, and make it easier to breathe. Medicines may be inhaled, taken as a pill, or injected. Short-term medicines relieve your symptoms quickly. Long-term medicines are used to prevent future asthma attacks. Other medicines may be needed if your regular medicines are not able to prevent attacks. You may also need medicine to help control your allergies. Ask your healthcare provider for more information about any medicine you are given and how to take it safely.    •Take your medicine as directed. Contact your healthcare provider if you think your medicine is not helping or if you have side effects. Tell your provider if you are allergic to any medicine. Keep a list of the medicines, vitamins, and herbs you take. Include the amounts, and when and why you take them. Bring the list or the pill bottles to follow-up visits. Carry your medicine list with you in case of an emergency.    Manage your symptoms and prevent future attacks:   •Follow your Asthma Action Plan (AAP). This is a written plan that you and your healthcare provider create. It explains which medicine you need and when to change doses if necessary. It also explains how you can monitor symptoms and use a peak flow meter. The meter measures how well your lungs are working.  •Manage other health conditions, such as allergies, acid reflux, and sleep apnea.  •Identify and avoid triggers. These may include pets, dust mites, mold, and cockroaches.  •Do not smoke or be around others who smoke. Nicotine and other chemicals in cigarettes and cigars can cause lung damage. Ask your healthcare provider for information if you currently smoke and need help to quit. E-cigarettes or smokeless tobacco still contain nicotine. Talk to your healthcare provider before you use these products.  •Ask about the flu vaccine. The flu can make your asthma worse. You may need a yearly flu shot.    Follow up with your healthcare provider as directed: You will need to return to make sure your medicine is working and your symptoms are controlled. You may be referred to an asthma or allergy specialist. You may be asked to keep a record of your peak flow values and bring it with you to your appointments. Write down your questions so you remember to ask them during your visits.

## 2022-10-01 NOTE — ED PROVIDER NOTE - PATIENT PORTAL LINK FT
You can access the FollowMyHealth Patient Portal offered by [facility] by registering at the following website: [email or web address]. By joining [facility]’s FollowMyHealth portal, you will also be able to view your health information using other applications (apps) compatible with our system.

## 2022-10-01 NOTE — ED PROVIDER NOTE - NSICDXPASTMEDICALHX_GEN_ALL_CORE_FT
PAST MEDICAL HISTORY:  Asthma     CVA (cerebral vascular accident)     High Cholesterol (ICD9 272.0)     HTN - Hypertension     Hypertension     Hypothyroidism

## 2022-10-01 NOTE — ED PROVIDER NOTE - CLINICAL SUMMARY MEDICAL DECISION MAKING FREE TEXT BOX
Fabrice PGY2: 87yo F with asthma, hypothyroidism, CVA presents for wheezing, cough, & fevers. Plan for labs, RVP swab, duonebs, cxr and steroids. Reassess after nebs -like viral source. Reassess and dispo home after tx w/ coarse  steroids.

## 2022-10-01 NOTE — ED PROVIDER NOTE - NS ED ROS FT
Constitutional:  See HPI  ENMT: No neck pain or stiffness, no sore throat  Cardiac:  No chest pain  Respiratory:  +dry cough +subjective SOB, +wheezing  GI:  No nausea, vomiting, diarrhea or abdominal pain.  :  No urinary compalints  MS:  No back pain.  Neuro:  No headache   Except as documented in the HPI,  all other systems are negative

## 2022-10-01 NOTE — ED ADULT TRIAGE NOTE - CHIEF COMPLAINT QUOTE
pt sent in from urgent care for sob/ flu like symptoms fever x 2 days with cough, pt using nebs with minimal improvement. pmh: htn, asthma

## 2022-10-01 NOTE — ED ADULT NURSE NOTE - OBJECTIVE STATEMENT
Received pt into spot #20 , accompanied by daughter. Pt c/o cough and fever x 3 days. Daughter states temp today was 101F. Pt also c/o SOB. Pt appears tachypneic rate 22-24. O2 sat %. #20 angio placed to RAC saline locked. Solumedrol 60mg IVP given as ordered. Duonebs x 3 ordered. No audible wheezing noted. No edema noted. Skin intact. Pt eval by Fabrice and Dr. Cervantes.

## 2022-10-01 NOTE — ED PROVIDER NOTE - PHYSICAL EXAMINATION
CONSTITUTIONAL: NAD, presents on RA, comfortable appearing  SKIN: Warm dry  HEAD: NCAT  EYES: NL inspection  ENT: MMM  NECK: Supple; non tender.  CARD: RRR  RESP: b/l expiratory wheezing all fields  ABD: S/NT no R/G  EXT: no pedal edema  NEURO: Grossly unremarkable  PSYCH: Cooperative, appropriate.

## 2023-03-15 NOTE — PATIENT PROFILE ADULT. - BILL OF RIGHTS/ADMISSION INFORMATION PROVIDED TO:
Isotretinoin Counseling: Patient should get monthly blood tests, not donate blood, not drive at night if vision affected, not share medication, and not undergo elective surgery for 6 months after tx completed. Side effects reviewed, pt to contact office should one occur. Patient

## 2023-04-15 NOTE — ED ADULT NURSE NOTE - PRIMARY CARE PROVIDER
Would you please schedule her an appointment to have a phone telephone visit with me in the next 3 to 4 weeks?   ukn

## 2023-09-25 NOTE — PATIENT PROFILE ADULT - NSPROMEDSDISPOSITION_GEN_A_NUR
Pt states she has chronic pancreatitis and started with an attack yesterday. sent home w/ family/friend

## 2025-03-20 NOTE — PHYSICAL THERAPY INITIAL EVALUATION ADULT - WEIGHT-BEARING RESTRICTIONS: SIT/STAND, REHAB EVAL
You were seen today for Chronic Kidney Disease Stage 2      Medication change:   None at this time    Blood tests ordered:   Labs today with renal panel to recheck your potassium level as well as CBC to reevaluate your white count  Labs monthly: CMP, CBC with differential, urinalysis, protein/creatinine ratio urine, microalbumin urine    Other orders:  Provide a urine specimen in office today for Dr. Fink to look at under the microscope. We will call you if he finds anything abnormal.      Check your blood pressure twice daily at home and keep a log (once 30 minutes after taking your medications in the morning and once 30 minutes before bedtime). Let us know if you are persistently >130, <110 and/or lightheaded/dizzy.      Dietary recommendations:   Fluid intake of 60-90 oz per day.  Low sodium diet less than 2 grams per day.  Depending on your potassium levels today we may ask you to reduce your potassium intake      Follow up in 3 months, (June 2025 - before you leave!), with non fasting blood/urine labs one week prior to the appointment. We will go over the results at your follow up appointment. -- CMP, Magnesium level, CBC with differential, Urinalysis, UPCR, Microalbumin urine, iPTH, Vitamin D -25 Hydroxy    We will contact you by phone or mail when we receive the schedule.    Remember to attempt to stay away from Ibuprofen, Aleve, and Advil. Tylenol/Acetaminophen based products are best.    If any issues or questions should happen before your next appointment, do not hesitate to call. Our office number is [phone].   
full weight-bearing